# Patient Record
Sex: FEMALE | Race: WHITE | NOT HISPANIC OR LATINO | Employment: UNEMPLOYED | URBAN - METROPOLITAN AREA
[De-identification: names, ages, dates, MRNs, and addresses within clinical notes are randomized per-mention and may not be internally consistent; named-entity substitution may affect disease eponyms.]

---

## 2023-08-22 ENCOUNTER — HOSPITAL ENCOUNTER (OUTPATIENT)
Dept: DATA CONVERSION | Facility: HOSPITAL | Age: 68
Discharge: HOME | End: 2023-08-22
Payer: COMMERCIAL

## 2023-08-22 DIAGNOSIS — E78.2 MIXED HYPERLIPIDEMIA: ICD-10-CM

## 2023-08-22 DIAGNOSIS — R73.01 IMPAIRED FASTING GLUCOSE: ICD-10-CM

## 2023-08-22 DIAGNOSIS — E55.9 VITAMIN D DEFICIENCY, UNSPECIFIED: ICD-10-CM

## 2023-08-22 DIAGNOSIS — D64.9 ANEMIA, UNSPECIFIED: ICD-10-CM

## 2023-08-22 LAB
25(OH)D3 SERPL-MCNC: 38 NG/ML (ref 31–100)
ALBUMIN SERPL-MCNC: 4.2 GM/DL (ref 3.5–5)
ALBUMIN/GLOB SERPL: 1.4 RATIO (ref 1.5–3)
ALP BLD-CCNC: 66 U/L (ref 35–125)
ALT SERPL-CCNC: 11 U/L (ref 5–40)
ANION GAP SERPL CALCULATED.3IONS-SCNC: 14 MMOL/L (ref 0–19)
APPEARANCE PLAS: ABNORMAL
AST SERPL-CCNC: 13 U/L (ref 5–40)
BASOPHILS # BLD AUTO: 0.11 K/UL (ref 0–0.22)
BASOPHILS NFR BLD AUTO: 1.3 % (ref 0–1)
BILIRUB SERPL-MCNC: 0.5 MG/DL (ref 0.1–1.2)
BUN SERPL-MCNC: 13 MG/DL (ref 8–25)
BUN/CREAT SERPL: 21.7 RATIO (ref 8–21)
CALCIUM SERPL-MCNC: 9.4 MG/DL (ref 8.5–10.4)
CHLORIDE SERPL-SCNC: 104 MMOL/L (ref 97–107)
CHOLEST SERPL-MCNC: 230 MG/DL (ref 133–200)
CHOLEST/HDLC SERPL: 4.5 RATIO
CO2 SERPL-SCNC: 23 MMOL/L (ref 24–31)
COLOR SPUN FLD: ABNORMAL
CREAT SERPL-MCNC: 0.6 MG/DL (ref 0.4–1.6)
DEPRECATED RDW RBC AUTO: 42.2 FL (ref 37–54)
DIFFERENTIAL METHOD BLD: ABNORMAL
EOSINOPHIL # BLD AUTO: 0.25 K/UL (ref 0–0.45)
EOSINOPHIL NFR BLD: 3 % (ref 0–3)
ERYTHROCYTE [DISTWIDTH] IN BLOOD BY AUTOMATED COUNT: 14.4 % (ref 11.7–15)
FASTING STATUS PATIENT QL REPORTED: ABNORMAL
FERRITIN SERPL-MCNC: 8 NG/ML (ref 13–150)
GFR SERPL CREATININE-BSD FRML MDRD: 98 ML/MIN/1.73 M2
GLOBULIN SER-MCNC: 3 G/DL (ref 1.9–3.7)
GLUCOSE SERPL-MCNC: 113 MG/DL (ref 65–99)
HBA1C MFR BLD: 6.4 % (ref 4–6)
HCT VFR BLD AUTO: 40.4 % (ref 36–44)
HDLC SERPL-MCNC: 51 MG/DL
HGB BLD-MCNC: 12.7 GM/DL (ref 12–15)
IMM GRANULOCYTES # BLD AUTO: 0.02 K/UL (ref 0–0.1)
IRON SATN MFR SERPL: 14.6 % (ref 12–50)
IRON SERPL-MCNC: 53 UG/DL (ref 30–160)
LDLC SERPL CALC-MCNC: 160 MG/DL (ref 65–130)
LYMPHOCYTES # BLD AUTO: 1.36 K/UL (ref 1.2–3.2)
LYMPHOCYTES NFR BLD MANUAL: 16.4 % (ref 20–40)
MCH RBC QN AUTO: 25.9 PG (ref 26–34)
MCHC RBC AUTO-ENTMCNC: 31.4 % (ref 31–37)
MCV RBC AUTO: 82.4 FL (ref 80–100)
MONOCYTES # BLD AUTO: 0.58 K/UL (ref 0–0.8)
MONOCYTES NFR BLD MANUAL: 7 % (ref 0–8)
NEUTROPHILS # BLD AUTO: 5.99 K/UL
NEUTROPHILS # BLD AUTO: 5.99 K/UL (ref 1.8–7.7)
NEUTROPHILS.IMMATURE NFR BLD: 0.2 % (ref 0–1)
NEUTS SEG NFR BLD: 72.1 % (ref 50–70)
NRBC BLD-RTO: 0 /100 WBC
PLATELET # BLD AUTO: 350 K/UL (ref 150–450)
PMV BLD AUTO: 10.9 CU (ref 7–12.6)
POTASSIUM SERPL-SCNC: 4.3 MMOL/L (ref 3.4–5.1)
PROT SERPL-MCNC: 7.2 G/DL (ref 5.9–7.9)
RBC # BLD AUTO: 4.9 M/UL (ref 4–4.9)
SODIUM SERPL-SCNC: 141 MMOL/L (ref 133–145)
TIBC SERPL-MCNC: 363 UG/DL (ref 228–428)
TRIGL SERPL-MCNC: 94 MG/DL (ref 40–150)
TSH SERPL DL<=0.05 MIU/L-ACNC: 1.02 MIU/L (ref 0.27–4.2)
WBC # BLD AUTO: 8.3 K/UL (ref 4.5–11)

## 2023-08-23 PROBLEM — I10 ESSENTIAL HYPERTENSION: Status: ACTIVE | Noted: 2023-08-23

## 2023-08-23 PROBLEM — H90.3 SENSORINEURAL HEARING LOSS, BILATERAL: Status: ACTIVE | Noted: 2023-08-23

## 2023-08-23 PROBLEM — H60.543 ECZEMA OF BOTH EXTERNAL EARS: Status: ACTIVE | Noted: 2023-08-23

## 2023-08-23 PROBLEM — R92.8 ABNORMAL MAMMOGRAM: Status: ACTIVE | Noted: 2023-08-23

## 2023-08-23 PROBLEM — M54.16 LUMBAR RADICULITIS: Status: ACTIVE | Noted: 2023-08-23

## 2023-08-23 PROBLEM — F32.9 MAJOR DEPRESSIVE DISORDER, SINGLE EPISODE, UNSPECIFIED: Status: ACTIVE | Noted: 2023-08-23

## 2023-08-23 PROBLEM — J32.9 CHRONIC SINUSITIS: Status: ACTIVE | Noted: 2023-08-23

## 2023-08-23 PROBLEM — K57.90 DIVERTICULOSIS: Status: ACTIVE | Noted: 2023-08-23

## 2023-08-23 PROBLEM — R79.89 OTHER SPECIFIED ABNORMAL FINDINGS OF BLOOD CHEMISTRY: Status: ACTIVE | Noted: 2023-08-23

## 2023-08-23 PROBLEM — R20.2 PARESTHESIA: Status: ACTIVE | Noted: 2023-08-23

## 2023-08-23 PROBLEM — E55.9 VITAMIN D DEFICIENCY: Status: ACTIVE | Noted: 2023-08-23

## 2023-08-23 PROBLEM — J30.9 ALLERGIC RHINITIS: Status: ACTIVE | Noted: 2023-08-23

## 2023-08-23 PROBLEM — N60.91 ATYPICAL DUCTAL HYPERPLASIA OF RIGHT BREAST: Status: ACTIVE | Noted: 2023-08-23

## 2023-08-23 PROBLEM — E78.2 MIXED HYPERLIPIDEMIA: Status: ACTIVE | Noted: 2023-08-23

## 2023-08-23 PROBLEM — N95.1 MENOPAUSAL STATE: Status: ACTIVE | Noted: 2023-08-23

## 2023-08-23 RX ORDER — FLUTICASONE PROPIONATE 50 MCG
1 SPRAY, SUSPENSION (ML) NASAL DAILY
COMMUNITY
Start: 2021-02-09

## 2023-08-23 RX ORDER — AMLODIPINE BESYLATE 5 MG/1
5 TABLET ORAL DAILY
COMMUNITY
End: 2023-10-25

## 2023-08-23 RX ORDER — ESCITALOPRAM OXALATE 20 MG/1
20 TABLET ORAL DAILY
COMMUNITY

## 2023-08-23 RX ORDER — KETOCONAZOLE 20 MG/G
1 CREAM TOPICAL DAILY
COMMUNITY

## 2023-08-23 RX ORDER — CHOLECALCIFEROL (VITAMIN D3) 50 MCG
2000 TABLET ORAL DAILY
COMMUNITY
Start: 2018-04-19

## 2023-08-23 RX ORDER — FLUOCINOLONE ACETONIDE 0.1 MG/G
1 CREAM TOPICAL EVERY OTHER DAY
COMMUNITY

## 2023-08-23 RX ORDER — ALENDRONATE SODIUM 70 MG/1
70 TABLET ORAL
COMMUNITY
End: 2024-05-07 | Stop reason: SDUPTHER

## 2023-10-17 ENCOUNTER — OFFICE VISIT (OUTPATIENT)
Dept: OPHTHALMOLOGY | Facility: CLINIC | Age: 68
End: 2023-10-17
Payer: COMMERCIAL

## 2023-10-17 ENCOUNTER — CLINICAL SUPPORT (OUTPATIENT)
Dept: OPHTHALMOLOGY | Facility: CLINIC | Age: 68
End: 2023-10-17
Payer: COMMERCIAL

## 2023-10-17 ENCOUNTER — APPOINTMENT (OUTPATIENT)
Dept: OPHTHALMOLOGY | Facility: CLINIC | Age: 68
End: 2023-10-17
Payer: COMMERCIAL

## 2023-10-17 DIAGNOSIS — H52.7 REFRACTIVE ERROR: ICD-10-CM

## 2023-10-17 DIAGNOSIS — H16.202 KERATOCONJUNCTIVITIS OF LEFT EYE: ICD-10-CM

## 2023-10-17 DIAGNOSIS — H25.813 COMBINED FORMS OF AGE-RELATED CATARACT OF BOTH EYES: Primary | ICD-10-CM

## 2023-10-17 PROCEDURE — 99213 OFFICE O/P EST LOW 20 MIN: CPT | Performed by: OPHTHALMOLOGY

## 2023-10-17 PROCEDURE — 92015 DETERMINE REFRACTIVE STATE: CPT | Performed by: OPHTHALMOLOGY

## 2023-10-17 PROCEDURE — 92325 MODIFICATION OF CONTACT LENS: CPT | Performed by: OPHTHALMOLOGY

## 2023-10-17 RX ORDER — CLOTRIMAZOLE AND BETAMETHASONE DIPROPIONATE 10; .64 MG/G; MG/G
1 CREAM TOPICAL 2 TIMES DAILY
COMMUNITY
Start: 2023-09-08 | End: 2023-11-09 | Stop reason: WASHOUT

## 2023-10-17 ASSESSMENT — VISUAL ACUITY
OS_PH_CC+: -1
CORRECTION_TYPE: GLASSES
CORRECTION_TYPE: CONTACTS
METHOD: SNELLEN - SINGLE
METHOD: SNELLEN - SINGLE
OS_CC: 20/70
OD_CC: 20/25
OS_PH_CC: 20/30
OS_PH_SC+: -1
OS_PH_SC: 20/30
OS_CC: 20/150
OD_CC+: -2
OD_CC+: -1
OD_CC: 20/20

## 2023-10-17 ASSESSMENT — KERATOMETRY
METHOD_AUTO_MANUAL: AUTOMATED
OD_AXISANGLE2_DEGREES: 140
OD_K2POWER_DIOPTERS: 45.50
OS_AXISANGLE2_DEGREES: 5
OS_AXISANGLE_DEGREES: 95
OD_AXISANGLE_DEGREES: 50
OS_K1POWER_DIOPTERS: 43.50
OS_K2POWER_DIOPTERS: 44.50
OD_K1POWER_DIOPTERS: 44.75

## 2023-10-17 ASSESSMENT — ENCOUNTER SYMPTOMS
MUSCULOSKELETAL NEGATIVE: 1
GASTROINTESTINAL NEGATIVE: 0
LOSS OF SENSATION IN FEET: 0
CONSTITUTIONAL NEGATIVE: 0
CARDIOVASCULAR NEGATIVE: 0
HEMATOLOGIC/LYMPHATIC NEGATIVE: 0
ALLERGIC/IMMUNOLOGIC NEGATIVE: 0
RESPIRATORY NEGATIVE: 0
OCCASIONAL FEELINGS OF UNSTEADINESS: 0
DEPRESSION: 0
NEUROLOGICAL NEGATIVE: 0
PSYCHIATRIC NEGATIVE: 0
ENDOCRINE NEGATIVE: 0
EYES NEGATIVE: 0

## 2023-10-17 ASSESSMENT — REFRACTION_CURRENTRX
OS_SPHERE: -2.00
OS_BRAND: ULTRA
OD_BRAND: ULTRA
OS_DIAMETER: 14.2
OS_BASECURVE: 8.5
OD_SPHERE: -2.25
OD_BRAND: ULTRA
OD_DIAMETER: 14.2
OS_BRAND: ULTRA
OS_SPHERE: -2.75
OS_DIAMETER: 14.2
OD_BASECURVE: 8.5
OD_DIAMETER: 14.2
OS_BASECURVE: 8.5
OD_BASECURVE: 8.5
OD_SPHERE: -2.25

## 2023-10-17 ASSESSMENT — REFRACTION_WEARINGRX
SPECS_TYPE: OTC READERS
OD_AXIS: 128
OS_SPHERE: -1.50
OS_CYLINDER: -1.00
OS_AXIS: 012
OD_SPHERE: -1.75
SPECS_TYPE: SVL
OS_SPHERE: +2.00
OD_CYLINDER: -0.75
OD_SPHERE: +2.00

## 2023-10-17 ASSESSMENT — SLIT LAMP EXAM - LIDS
COMMENTS: NORMAL
COMMENTS: NORMAL

## 2023-10-17 ASSESSMENT — TONOMETRY
OD_IOP_MMHG: 14
OS_IOP_MMHG: 13
IOP_METHOD: GOLDMANN APPLANATION

## 2023-10-17 ASSESSMENT — PATIENT HEALTH QUESTIONNAIRE - PHQ9
2. FEELING DOWN, DEPRESSED OR HOPELESS: NOT AT ALL
1. LITTLE INTEREST OR PLEASURE IN DOING THINGS: NOT AT ALL
SUM OF ALL RESPONSES TO PHQ9 QUESTIONS 1 AND 2: 0

## 2023-10-17 ASSESSMENT — REFRACTION_MANIFEST
OD_SPHERE: -2.25
OS_SPHERE: -3.25
OS_AXIS: 010
OD_AXIS: 115
OD_CYLINDER: -0.75
METHOD_AUTOREFRACTION: 1
OS_CYLINDER: -1.25

## 2023-10-17 ASSESSMENT — CUP TO DISC RATIO
OS_RATIO: 0.3
OD_RATIO: 0.3

## 2023-10-17 ASSESSMENT — EXTERNAL EXAM - RIGHT EYE: OD_EXAM: NORMAL

## 2023-10-17 ASSESSMENT — PAIN SCALES - GENERAL: PAINLEVEL: 0-NO PAIN

## 2023-10-17 ASSESSMENT — EXTERNAL EXAM - LEFT EYE: OS_EXAM: NORMAL

## 2023-10-17 NOTE — PROGRESS NOTES
Assessment/Plan   Problem List Items Addressed This Visit          Eye/Vision problems    Combined forms of age-related cataract of both eyes - Primary     Worse cataract left eye (OS) than right eye (OD) but non significant overall. Advised would recommend to observe and correct with optical options. Could trial new soft contact lens (SCL) once left eye (OS) feeling better.          Keratoconjunctivitis of left eye     Suspect irritation related to putting in old soft contact lens (SCL). Will give new trials but instructed to hold until left eye (OS) feeling better. Will have start regular lubrication for next few days.          Refractive error     Good response to both RX and trial soft contact lens (SCL). Will provide with trials and plan to have call with results.             Provided reassurance regarding above diagnoses and care received in the office visit today. Discussed outcomes and options along with the importance of treatment compliance. Understands the importance of any follow up visits. Patient instructed to call/communicate with our office if any new issues, questions, or concerns.     Will plan to see back in 1 year for full with SCL or sooner PRN

## 2023-10-17 NOTE — ASSESSMENT & PLAN NOTE
Good response to both RX and trial soft contact lens (SCL). Will provide with trials and plan to have call with results.

## 2023-10-17 NOTE — ASSESSMENT & PLAN NOTE
Suspect irritation related to putting in old soft contact lens (SCL). Will give new trials but instructed to hold until left eye (OS) feeling better. Will have start regular lubrication for next few days.

## 2023-10-17 NOTE — ASSESSMENT & PLAN NOTE
Worse cataract left eye (OS) than right eye (OD) but non significant overall. Advised would recommend to observe and correct with optical options. Could trial new soft contact lens (SCL) once left eye (OS) feeling better.

## 2023-10-17 NOTE — PATIENT INSTRUCTIONS
Thank you so much for choosing me to provide your care today!    If you were dilated your vision may remain blurry   or light sensitive for several hours.    The nature of eye and vision problems can require frequent follow up, please make every effort to adhere to any future appointments.    If you have any issues, questions, or concerns,   please do not hesitate to reach out.    If you receive a survey in regards to your care today, please mention any exceptional care my office staff and/or technicians provided.    You can reach our office at this number:  [phone]     Artificial Tears/lubricating drops  Recommendations for daily use      Refresh  [x]Refresh Tears []Refresh Advance  []Refresh Optive  []Refresh LiquiGel  []Refresh preservative free in single use vial    Systane  [x]Systane ultra []Systane Balance  []Systane Gel   []Systane preservative free in single use vial    Genteal  []Genteal  []Genteal gel    Blink  [x]Blink   []Blink for contacts      You may use these drops:  []1 drop 2-4 times daily  [x]1 drop 4 times daily  []1 drop every 1-2 hours or as needed  []At bedtime    It is OK to substitute generic store brand varieties of drops. Artificial tears work best when used consistently instead of as needed.    *Avoid any drops for allergy/itching/redness unless directed otherwise*

## 2023-10-18 ENCOUNTER — TREATMENT (OUTPATIENT)
Dept: SPEECH THERAPY | Facility: CLINIC | Age: 68
End: 2023-10-18
Payer: COMMERCIAL

## 2023-10-18 DIAGNOSIS — H90.3 SENSORINEURAL HEARING LOSS, BILATERAL: Primary | ICD-10-CM

## 2023-10-18 DIAGNOSIS — R48.8 OTHER SYMBOLIC DYSFUNCTIONS: ICD-10-CM

## 2023-10-18 PROCEDURE — 92507 TX SP LANG VOICE COMM INDIV: CPT | Mod: GN | Performed by: SPEECH-LANGUAGE PATHOLOGIST

## 2023-10-18 ASSESSMENT — PAIN - FUNCTIONAL ASSESSMENT: PAIN_FUNCTIONAL_ASSESSMENT: 0-10

## 2023-10-18 ASSESSMENT — ENCOUNTER SYMPTOMS
LOSS OF SENSATION IN FEET: 0
OCCASIONAL FEELINGS OF UNSTEADINESS: 0
DEPRESSION: 0

## 2023-10-18 ASSESSMENT — PAIN SCALES - GENERAL: PAINLEVEL_OUTOF10: 0 - NO PAIN

## 2023-10-18 NOTE — PROGRESS NOTES
Speech-Language Pathology    Outpatient Speech-Language Pathology Treatment     Patient Name: Luisa Pearson  MRN: [id number]  Today's Date: 10/18/2023     Time Calculation  Start Time: 0730  Stop Time: 0815  Time Calculation (min): 45 min      Current Problem:   Patient Active Problem List   Diagnosis    Abnormal mammogram    Allergic rhinitis    Atypical ductal hyperplasia of right breast    Chronic sinusitis    Diverticulosis    Eczema of both external ears    Essential hypertension    Lumbar radiculitis    Major depressive disorder, single episode, unspecified    Menopausal state    Mixed hyperlipidemia    Other specified abnormal findings of blood chemistry    Paresthesia    Sensorineural hearing loss, bilateral    Vitamin D deficiency    Combined forms of age-related cataract of both eyes    Keratoconjunctivitis of left eye    Refractive error    Other symbolic dysfunctions         SLP Assessment:  Patient was seen today for an aural rehab evaluation due to LEFT cochlear implantation.     MODERATE auditory skills deficits  Therapy is warranted to increase auditory skills to improve overall communication skills.       Plan:  SLP TX Plan: Continue Plan of Care  SLP Frequency:  (1-2x per month)  Duration: 6 months  Discussed POC: Patient  Discussed Risks/Benefits: Yes, Patient  Patient/Caregiver Agreeable: Yes      Subjective   Patient is being seen for their first follow-up visit in [facility] this date. For full history, evaluation, and other details from previous care to-date, please refer to past medical records in Ambulatory Electronic Medical Records. Most recent eval/re-eval was completed on June 29, 2023.    Patient arrived on time to today's follow up session. Patient reports being sick with nasal congestion the past week or 2, minimal home programming. Patient was not able to get car looked at for blue tooth due to being sick. Patient reports experiences pressure/ear blockage feeling- Clinician recommended  going to doctor to follow up. Patient reports no recent falls this past year or fear of falling. \    Patient showed increase in errors identifying /d,g/ and /p,t/ minimal pairs in all position of words (in quiet and in noise). Continue to practice, will follow up with Dr. Valdivia, audiologist.     Clinician educated patient on the importance of home programming. Clinician provided patient with email on [employer] deshawn to download for home programming, along with Zeb[employer] deshawn, streaming to phone during phone conversations, and mini antonino with TV.       Most Recent Visit:   9/27/23- in [facility]     General Visit Information:   Reason for Referral: Cochlear Implant  Referred By: Dr. Valdivia  Past Medical History Relevant to Rehab: Hearing Loss  Patient Seen During This Visit: Yes  Arrival: Independent  Number of Authorized Treatments :  (40 dollar co pay)  Total Number of Visits : 10      Pain Assessment:   Pain Assessment: 0-10  Pain Score: 0 - No pain      Objective   Long term goals:   Patient will increase awareness of speech sounds during conversation with 70% accuracy in 6 months.     Short term goals:   Goal: Patient will identify minimal pairs differing in manner, voice, and place of articulation in quiet and in noise with 70% accuracy in 3-6 months.   Established: 6/29/23  Progress: In quiet: manner- 70%, voice: 80%, place: 60%; in noise: manner: 80%, voice: 70%, place: 50%   Status: Progressing    Goal: Patient will identify sentences of an unknown topic in quiet and in noise with 70% accuracy in 3-6 months.   Established: 6/29/23  Progress: in noise: 60%   Status: Progressing     Goal: Patient will identify 1-3 paragraphs in quiet by answering WH questions and/or providing summaries in 80% of opportunities in 3-6 months.   Established: 8/2/23; goal changed to quiet on 8/9/23  Progress: in quiet: 3 short paragraphs, WH questions: 4/5 patient could not identify main character's name.   Status: Progressing        Outpatient Education:  Adult Outpatient Education  Individual(s) Educated: Patient  Written Education : Emailed cochlear  deshawn information  Verbal Education : Educated patient on following home programming: cochlear  deshawn, HEAROES deshawn, streaming phone conversations, and mini antonino with TV  Risk and Benefits Discussed with Patient/Caregiver/Other: yes  Patient/Caregiver Demonstrated Understanding: yes  Plan of Care Discussed and Agreed Upon: yes  Patient Response to Education: Patient/Caregiver Verbalized Understanding of Information, Patient/Caregiver Performed Return Demonstration of Exercises/Activities, Patient/Caregiver Asked Appropriate Questions

## 2023-10-23 DIAGNOSIS — I10 ESSENTIAL (PRIMARY) HYPERTENSION: ICD-10-CM

## 2023-10-25 RX ORDER — AMLODIPINE BESYLATE 5 MG/1
5 TABLET ORAL DAILY
Qty: 90 TABLET | Refills: 3 | Status: SHIPPED | OUTPATIENT
Start: 2023-10-25 | End: 2024-10-24

## 2023-10-26 ENCOUNTER — DOCUMENTATION (OUTPATIENT)
Dept: OPHTHALMOLOGY | Facility: CLINIC | Age: 68
End: 2023-10-26
Payer: COMMERCIAL

## 2023-10-26 ASSESSMENT — REFRACTION_CURRENTRX
OD_SPHERE: -2.25
OD_DIAMETER: 14.2
OS_SPHERE: -3.00
OS_BASECURVE: 8.5
OD_BRAND: ULTRA
OS_AXIS: 010
OD_BASECURVE: 8.5
OS_SPHERE: -2.75
OS_BRAND: ULTRA
OD_SPHERE: -2.25
OS_BASECURVE: 8.5
OS_SPHERE: -2.00
OD_BRAND: ULTRA
OD_BRAND: ULTRA
OD_DIAMETER: 14.2
OD_BASECURVE: 8.5
OS_DIAMETER: 14.5
OS_BASECURVE: 8.7
OS_DIAMETER: 14.2
OD_SPHERE: -2.25
OS_BRAND: ULTRA
OS_DIAMETER: 14.2
OD_BASECURVE: 8.5
OS_CYLINDER: -0.75
OS_BRAND: BIOFINITY TORIC
OD_DIAMETER: 14.2

## 2023-10-26 ASSESSMENT — VISUAL ACUITY
OS_CC: 20/30
METHOD: SNELLEN - LINEAR
OD_CC: 20/20
CORRECTION_TYPE: CONTACTS
OS_CC+: -2

## 2023-11-09 ENCOUNTER — OFFICE VISIT (OUTPATIENT)
Dept: OBSTETRICS AND GYNECOLOGY | Facility: CLINIC | Age: 68
End: 2023-11-09
Payer: COMMERCIAL

## 2023-11-09 VITALS
WEIGHT: 210 LBS | SYSTOLIC BLOOD PRESSURE: 170 MMHG | HEIGHT: 68 IN | BODY MASS INDEX: 31.83 KG/M2 | DIASTOLIC BLOOD PRESSURE: 80 MMHG

## 2023-11-09 DIAGNOSIS — B37.2 CANDIDIASIS OF SKIN: ICD-10-CM

## 2023-11-09 DIAGNOSIS — M81.0 AGE-RELATED OSTEOPOROSIS WITHOUT CURRENT PATHOLOGICAL FRACTURE: ICD-10-CM

## 2023-11-09 DIAGNOSIS — N95.1 MENOPAUSAL STATE: ICD-10-CM

## 2023-11-09 DIAGNOSIS — Z12.12 SCREENING FOR MALIGNANT NEOPLASM OF THE RECTUM: ICD-10-CM

## 2023-11-09 DIAGNOSIS — Z12.31 SCREENING MAMMOGRAM FOR BREAST CANCER: Primary | ICD-10-CM

## 2023-11-09 DIAGNOSIS — Z01.419 VISIT FOR GYNECOLOGIC EXAMINATION: ICD-10-CM

## 2023-11-09 DIAGNOSIS — N60.91 ATYPICAL DUCTAL HYPERPLASIA OF RIGHT BREAST: ICD-10-CM

## 2023-11-09 PROCEDURE — 1126F AMNT PAIN NOTED NONE PRSNT: CPT | Performed by: OBSTETRICS & GYNECOLOGY

## 2023-11-09 PROCEDURE — 99397 PER PM REEVAL EST PAT 65+ YR: CPT | Performed by: OBSTETRICS & GYNECOLOGY

## 2023-11-09 PROCEDURE — 1159F MED LIST DOCD IN RCRD: CPT | Performed by: OBSTETRICS & GYNECOLOGY

## 2023-11-09 PROCEDURE — 3077F SYST BP >= 140 MM HG: CPT | Performed by: OBSTETRICS & GYNECOLOGY

## 2023-11-09 PROCEDURE — 1036F TOBACCO NON-USER: CPT | Performed by: OBSTETRICS & GYNECOLOGY

## 2023-11-09 PROCEDURE — 3079F DIAST BP 80-89 MM HG: CPT | Performed by: OBSTETRICS & GYNECOLOGY

## 2023-11-09 ASSESSMENT — ENCOUNTER SYMPTOMS
UNEXPECTED WEIGHT CHANGE: 0
DYSURIA: 0
COLOR CHANGE: 0
DIZZINESS: 0
ABDOMINAL PAIN: 0
HEADACHES: 0
ABDOMINAL DISTENTION: 0
DIFFICULTY URINATING: 0
ACTIVITY CHANGE: 0
ADENOPATHY: 0
WEAKNESS: 0
FATIGUE: 0
CHEST TIGHTNESS: 0
SHORTNESS OF BREATH: 0
JOINT SWELLING: 0

## 2023-11-09 ASSESSMENT — LIFESTYLE VARIABLES
AUDIT-C TOTAL SCORE: 1
HOW MANY STANDARD DRINKS CONTAINING ALCOHOL DO YOU HAVE ON A TYPICAL DAY: 1 OR 2
HOW OFTEN DO YOU HAVE A DRINK CONTAINING ALCOHOL: MONTHLY OR LESS
SKIP TO QUESTIONS 9-10: 1
HOW OFTEN DO YOU HAVE SIX OR MORE DRINKS ON ONE OCCASION: NEVER

## 2023-11-09 NOTE — PROGRESS NOTES
"Annual-menopause  Subjective   Luisa Pearson is a 68 y.o.  former pt Dr. Briones, female who is here for a routine exam. Reports some vulvar bleeding brought on by excoriation but uses cream for relief. Denies any breast changes, pressure, bloating, or pain.     L cochlear implant in August 2021, which was activated in October 2021. R ear has hearing aide. Reports that she struggles most with clarity of speech when listening to others speak. This now PRECLUDES MRI breast screens.    Complaints: some vaginal irritation/itchiness  Periods: menopausal  Pain: none reported  AT risk for breast cancer but no longer an MRI candidate since she has had a cochlear implant.  Last pap smear date 10/27/2020- NEG, 10/12/2017- NEG, 09/23/2014- NEG, 10/2012 - negative.   Abnormal pap smear [facility] biopsy remotely .   Mammogram 12/27/22 neg; TC 34%; hx bxs.   DEXA Scans 10/04/2022: spine-0.5/ hip-2.6.   Menarche 10.   LMP 2007=age 52.   Sexual activity not currently sexually active.   Total pregnancies  0.    Review of Systems   Constitutional:  Negative for activity change, fatigue and unexpected weight change.   Respiratory:  Negative for chest tightness and shortness of breath.    Cardiovascular:  Negative for chest pain and leg swelling.   Gastrointestinal:  Negative for abdominal distention and abdominal pain.   Genitourinary:  Positive for genital sores. Negative for difficulty urinating, dysuria, pelvic pain, vaginal bleeding, vaginal discharge and vaginal pain.   Musculoskeletal:  Negative for gait problem and joint swelling.   Skin:  Negative for color change and rash.   Neurological:  Negative for dizziness, weakness and headaches.   Hematological:  Negative for adenopathy.   Objective   Visit Vitals  /80   Ht 1.715 m (5' 7.5\")   Wt 95.3 kg (210 lb)   LMP  (LMP Unknown)   BMI 32.41 kg/m²   OB Status Postmenopausal   Smoking Status Never   BSA 2.13 m²       General:   Alert and oriented, in no acute distress   Neck: " Supple. No visible thyromegaly.    Breast/Axilla: Normal to palpation bilaterally without masses, skin changes, or nipple discharge.    Abdomen: Soft, non-tender, without masses or organomegaly   Vulva: Atrophic mucosa with R labia majora excoriations along ridge   Vagina: atrophic mucosa ; modest vault restiction; NO  lesions, masses, or blood, or vaginal discharge.    Cervix: Normal without masses, lesions, or signs of cervicitis.    Uterus: Normal mobile, non-enlarged uterus    Adnexa: No palp  masses or tenderness   Pelvic Floor No POP noted. No high tone pelvic floor    Psych  Rectal Normal affect. Normal mood.   Normal stool, no masses, guaiac negative     Assessment/Plan    Encounter Diagnoses   Name Primary?    Visit for gynecologic examination; grossly nl breast exam; gyn exam pos for vulvar irritation.     Screening mammogram for breast cancer; known high TC risk due to hx ADH RT breast Yes    Menopausal state; mostly urogyn sxs now.     Screening for malignant neoplasm of the rectum; gc neg     Candidiasis of skin; good relief w meds;will call for refill when needed; pericare reviewed.     Age-related osteoporosis without current pathological fracture; on alendronate; next scan due 10/24.    ADH RT breast=increased risk for futurre cancer.  Abi Milligan MD

## 2023-11-10 PROBLEM — B37.2 CANDIDIASIS OF SKIN: Status: ACTIVE | Noted: 2023-11-10

## 2023-11-10 PROBLEM — M81.0 AGE-RELATED OSTEOPOROSIS WITHOUT CURRENT PATHOLOGICAL FRACTURE: Status: ACTIVE | Noted: 2023-11-10

## 2023-11-10 PROBLEM — Z01.419 VISIT FOR GYNECOLOGIC EXAMINATION: Status: ACTIVE | Noted: 2023-11-10

## 2023-11-10 PROBLEM — Z12.31 SCREENING MAMMOGRAM FOR BREAST CANCER: Status: ACTIVE | Noted: 2023-11-10

## 2023-11-10 PROBLEM — Z12.12 SCREENING FOR MALIGNANT NEOPLASM OF THE RECTUM: Status: ACTIVE | Noted: 2023-11-10

## 2023-11-28 ENCOUNTER — ANCILLARY PROCEDURE (OUTPATIENT)
Dept: RADIOLOGY | Facility: CLINIC | Age: 68
End: 2023-11-28
Payer: COMMERCIAL

## 2023-11-28 DIAGNOSIS — Z13.6 ENCOUNTER FOR SCREENING FOR CARDIOVASCULAR DISORDERS: Primary | ICD-10-CM

## 2023-11-28 DIAGNOSIS — R91.1 LUNG NODULE: ICD-10-CM

## 2023-11-28 PROCEDURE — 75571 CT HRT W/O DYE W/CA TEST: CPT

## 2023-11-29 ENCOUNTER — TREATMENT (OUTPATIENT)
Dept: SPEECH THERAPY | Facility: CLINIC | Age: 68
End: 2023-11-29
Payer: COMMERCIAL

## 2023-11-29 DIAGNOSIS — H90.3 SENSORINEURAL HEARING LOSS, BILATERAL: Primary | ICD-10-CM

## 2023-11-29 DIAGNOSIS — R48.8 OTHER SYMBOLIC DYSFUNCTIONS: ICD-10-CM

## 2023-11-29 PROCEDURE — 92507 TX SP LANG VOICE COMM INDIV: CPT | Mod: GN | Performed by: SPEECH-LANGUAGE PATHOLOGIST

## 2023-11-29 ASSESSMENT — PAIN SCALES - GENERAL: PAINLEVEL_OUTOF10: 0 - NO PAIN

## 2023-11-29 ASSESSMENT — PAIN - FUNCTIONAL ASSESSMENT: PAIN_FUNCTIONAL_ASSESSMENT: 0-10

## 2023-11-29 NOTE — PROGRESS NOTES
Speech-Language Pathology    Outpatient Speech-Language Pathology Treatment     Patient Name: Luisa Pearson  MRN: [id number]  Today's Date: 11/29/2023     Time Calculation  Start Time: 0730  Stop Time: 0815  Time Calculation (min): 45 min      Current Problem:   1. Sensorineural hearing loss, bilateral        2. Other symbolic dysfunctions              SLP Assessment:  Patient was seen today for an aural rehab evaluation due to LEFT cochlear implantation.      MODERATE auditory skills deficits  Therapy is warranted to increase auditory skills to improve overall communication skills.       Plan:  SLP TX Plan: Continue Plan of Care  Duration: 6 months  Discussed POC: Patient  Discussed Risks/Benefits: Patient, Yes  Patient/Caregiver Agreeable: Yes      Subjective   Patient arrived on time to today's session. Patient reports minimal home programming during the holidays. Patient reports some home programing with apps. Patient further reports noting improvements in listening during family dinners. Patient reports using Facetime while streaming to CI for the first time- went well. Patient reports improving in changing HA and CI programs to best fit listening environment.     General Visit Information:   Reason for Referral: Cochlear Implant  Referred By: Dr. Valdivia, Dr. Briones  Past Medical History Relevant to Rehab: Hearing Loss; Cochlear Implant  Patient Seen During This Visit: Yes  Arrival: Independent  Total Number of Visits : 11      Pain Assessment:   Pain Assessment: 0-10  Pain Score: 0 - No pain      Objective   Long term goals:   Patient will increase awareness of speech sounds during conversation with 70% accuracy in 6 months.     Short term goals:   Goal: Patient will identify minimal pairs differing in manner, voice, and place of articulation in quiet and in noise with 70% accuracy in 3-6 months.   Established: 6/29/23  Progress: In quiet: manner- 100%, voice: 100%, place: 60%;   Status: Progressing (mastered  manner and voice in quiet and in noise, continue to work on place cues)      Goal: Patient will identify sentences of an unknown topic in quiet and in noise with 70% accuracy in 3-6 months.   Established: 6/29/23  Progress: Did not target   Status: Progressing (In quiet, mastered)      Goal: Patient will identify 1-3 paragraphs in quiet by answering WH questions and/or providing summaries in 80% of opportunities in 3-6 months.   Established: 8/2/23; goal changed to quiet on 8/9/23  Progress: in quiet: 3 short paragraphs: retelling story- 4/5   Status: Progressing         Outpatient Education:  Clinician educated patient on phone settings, new home programming (Cochlear [employer] & HE[employer]), and idenitifying minimal pairs differing by place /g, d/ and /p, t/.

## 2023-11-30 ENCOUNTER — TELEPHONE (OUTPATIENT)
Dept: PRIMARY CARE | Facility: CLINIC | Age: 68
End: 2023-11-30
Payer: COMMERCIAL

## 2023-11-30 NOTE — TELEPHONE ENCOUNTER
----- Message from Qasim Underwood MD sent at 11/29/2023  5:59 PM EST -----  Please tell her that thankfully the calcium score is very low and I have a low suspicion for heart disease.  There is a small lung nodule on the right and the radiologist is suggesting we do a CAT scan specifically for this to get a better look at it and to make sure there are no other nodules.  This is standard when a lung nodule is discovered on a CT scan like this and I think she should go ahead and do it so I placed the order.

## 2023-12-19 ENCOUNTER — ANCILLARY PROCEDURE (OUTPATIENT)
Dept: RADIOLOGY | Facility: CLINIC | Age: 68
End: 2023-12-19
Payer: COMMERCIAL

## 2023-12-19 DIAGNOSIS — R91.1 LUNG NODULE: ICD-10-CM

## 2023-12-19 PROCEDURE — 71250 CT THORAX DX C-: CPT

## 2023-12-19 PROCEDURE — 71250 CT THORAX DX C-: CPT | Performed by: RADIOLOGY

## 2023-12-26 ENCOUNTER — TELEPHONE (OUTPATIENT)
Dept: PRIMARY CARE | Facility: CLINIC | Age: 68
End: 2023-12-26

## 2023-12-26 NOTE — TELEPHONE ENCOUNTER
----- Message from Qasim Underwood MD sent at 12/21/2023  1:03 PM EST -----  There are small nodules in the lung and we will probably repeat the scan one more time around the time she's already scheduled to see him in March.  There is small amount of fluid near the heart but I don't think we have to do additional testing on this unless she's having any symptoms like feeling short of breath or having chest pain.

## 2023-12-28 ENCOUNTER — HOSPITAL ENCOUNTER (OUTPATIENT)
Dept: RADIOLOGY | Facility: HOSPITAL | Age: 68
Discharge: HOME | End: 2023-12-28
Payer: COMMERCIAL

## 2023-12-28 VITALS — BODY MASS INDEX: 31.83 KG/M2 | WEIGHT: 210 LBS | HEIGHT: 68 IN

## 2023-12-28 DIAGNOSIS — Z12.31 SCREENING MAMMOGRAM FOR BREAST CANCER: ICD-10-CM

## 2023-12-28 PROCEDURE — 77063 BREAST TOMOSYNTHESIS BI: CPT

## 2024-02-06 ENCOUNTER — OFFICE VISIT (OUTPATIENT)
Dept: OTOLARYNGOLOGY | Facility: CLINIC | Age: 69
End: 2024-02-06
Payer: MEDICARE

## 2024-02-06 VITALS — HEIGHT: 68 IN | BODY MASS INDEX: 31.93 KG/M2

## 2024-02-06 DIAGNOSIS — H90.3 SENSORINEURAL HEARING LOSS (SNHL) OF BOTH EARS: ICD-10-CM

## 2024-02-06 DIAGNOSIS — J30.9 ALLERGIC RHINITIS, UNSPECIFIED SEASONALITY, UNSPECIFIED TRIGGER: ICD-10-CM

## 2024-02-06 DIAGNOSIS — H60.543 ECZEMA OF BOTH EXTERNAL EARS: Primary | ICD-10-CM

## 2024-02-06 PROCEDURE — 1159F MED LIST DOCD IN RCRD: CPT | Performed by: OTOLARYNGOLOGY

## 2024-02-06 PROCEDURE — 99213 OFFICE O/P EST LOW 20 MIN: CPT | Performed by: OTOLARYNGOLOGY

## 2024-02-06 PROCEDURE — 1126F AMNT PAIN NOTED NONE PRSNT: CPT | Performed by: OTOLARYNGOLOGY

## 2024-02-06 PROCEDURE — 1036F TOBACCO NON-USER: CPT | Performed by: OTOLARYNGOLOGY

## 2024-02-06 ASSESSMENT — ENCOUNTER SYMPTOMS
OCCASIONAL FEELINGS OF UNSTEADINESS: 0
DEPRESSION: 0
LOSS OF SENSATION IN FEET: 0

## 2024-02-06 ASSESSMENT — PAIN SCALES - GENERAL: PAINLEVEL: 0-NO PAIN

## 2024-02-06 NOTE — PROGRESS NOTES
"Chief Complaint   Patient presents with    Follow-up     ANNUAL VISIT      Date of Evaluation: 2/6/2024   HPI  Luisa Pearson is a 68 y.o. female  is here for follow-up on her ears. She continues with some eczema complaints. Cochlear implant working well.  Her allergies are bothering her more.  She is only using Flonase every other day  History:  with a history of bilateral sensorineural hearing loss and bilateral eczema of the ear canals. She has a left cochlear implant and wears a right hearing aid that transmits to the cochlear implant. She is using Flonase for allergic rhinitis        Past Medical History:   Diagnosis Date    Age-related nuclear cataract of both eyes     Personal history of diseases of the skin and subcutaneous tissue     History of eczema    Personal history of other diseases of the circulatory system     History of hypertension    Personal history of other mental and behavioral disorders     History of depression    Unspecified disorder of refraction       Past Surgical History:   Procedure Laterality Date    OTHER SURGICAL HISTORY  02/08/2022    Cochlear implant surgery          Medications:   Current Outpatient Medications   Medication Instructions    alendronate (FOSAMAX) 70 mg, oral, Every 7 days, 30 MIN BEFORE FIRST FOOD, DRINK OR MEDICINE OF THE DAY WITH WATER    amLODIPine (NORVASC) 5 mg, oral, Daily    cholecalciferol (VITAMIN D-3) 2,000 Units, oral, Daily    escitalopram (LEXAPRO) 20 mg, oral, Daily    fluocinolone 0.01 % cream 1 Application, Topical, Every other day, APPLY TO AFFECTED AREA    fluticasone (Flonase) 50 mcg/actuation nasal spray 1 spray, Each Nostril, Daily    ketoconazole (NIZOral) 2 % cream 1 Application, Topical, Daily        Allergies:  Allergies   Allergen Reactions    Penicillins Unknown    Cefdinir Unknown        Physical Exam:  Last Recorded Vitals  Height 1.727 m (5' 8\").  []General appearance: Well-developed, well-nourished in no acute distress, conversant " with normal voice quality    Head/face: No erythema or edema or facial tenderness, and normal facial nerve function bilaterally    External ear: Clear external auditory canals with normal pinnae, bilateral eczema.  Cerumen removed  Tube status: N/A  Middle ear: Tympanic membranes intact and mobile, middle ears normal.  Tympanic membrane perforation: N/A  Mastoid bowl: N/A  Hearing: Normal conversational awareness at normal speech thresholds    Nose visualized using: Anterior rhinoscopy  Nasal dorsum: Nontraumatic midline appearance  Septum: Midline, nonobstructing  Inferior turbinates: Normal, pink  Secretions: Dry    Oral cavity and oropharynx: Normal  Teeth: Good condition  Floor of mouth: without lesions  Palate: Normal hard palate, soft palate and uvula  Oropharynx: Clear, no lesions present  Buccal mucosa: Normal without masses or lesions  Lips: Normal    Nasopharynx: Inadequate mirror exam secondary to gag/anatomy    Neck:  Salivary glands: Normal bilateral parotid and submandibular glands by inspection and palpation.  Non-thyroid masses: No palpable masses or significant lymphadenopathy  Trachea: Midline  Thyroid: No thyromegaly or palpable nodules  Temporomandibular joint: Nontender  Cervical range of motion: Normal    Neurologic exam: Alert and oriented x3, appropriate affect.  Cranial nerves II-XII normal bilaterally  Extraocular movement: Extraocular movement intact, normal gaze alignment        Luisa was seen today for follow-up.  Diagnoses and all orders for this visit:  Eczema of both external ears (Primary)  Sensorineural hearing loss (SNHL) of both ears  Allergic rhinitis, unspecified seasonality, unspecified trigger       PLAN  Increase Flonase to daily use.  Fluocinolone cream bilaterally as needed.  Recheck in 1 year.  Continue with cochlear implant    Arden Briones MD

## 2024-03-04 ASSESSMENT — ENCOUNTER SYMPTOMS
SHORTNESS OF BREATH: 0
ABDOMINAL PAIN: 0
FEVER: 0

## 2024-03-04 NOTE — PROGRESS NOTES
[facility]: [facility]  PROGRESS NOTE      Luisa Pearson is a 68 y.o. female that is presenting today for followup, depression.    Assessment/Plan   Diagnoses and all orders for this visit:  Current mild episode of major depressive disorder without prior episode (CMS/HCC)  Essential hypertension  -     CBC and Auto Differential; Future  -     Comprehensive Metabolic Panel; Future  -     Lipid Panel; Future  -     TSH with reflex to Free T4 if abnormal; Future  Vitamin D deficiency  -     Vitamin D 25-Hydroxy,Total (for eval of Vitamin D levels); Future  Encounter for routine laboratory testing  -     CBC and Auto Differential; Future  -     Comprehensive Metabolic Panel; Future  -     Hemoglobin A1C; Future  -     Lipid Panel; Future  -     Vitamin D 25-Hydroxy,Total (for eval of Vitamin D levels); Future  -     TSH with reflex to Free T4 if abnormal; Future  Hyperglycemia  -     POCT glycosylated hemoglobin (Hb A1C) manually resulted  -     Hemoglobin A1C; Future  Depression, unspecified depression type  -     buPROPion XL (Wellbutrin XL) 150 mg 24 hr tablet; Take 1 tablet (150 mg) by mouth once daily in the morning. Do not crush, chew, or split.  Lung nodules  -     CT chest wo IV contrast; Future  Gastroesophageal reflux disease without esophagitis  -     omeprazole OTC (PriLOSEC OTC) 20 mg EC tablet; Take 1 tablet (20 mg) by mouth once daily. Do not crush, chew, or split.  Other orders  -     Follow Up In Primary Care - Medicare Annual; Future    Will try to add wellbutrin to ssri.  Pulmonary nodules - will continue to monitor.  Trace pericardial effusion - no SOB/cardiac symptoms.  Doubt significant.  Was incidentally found on CT calcium score screening.    Subjective   HPI  68 y.o. female here for follow up.  Sturggles a bit more w/ mood - not sure if ssri effective.  No SI.    Review of Systems   Constitutional:  Negative for fever.   Respiratory:  Negative for shortness of breath.   "  Cardiovascular:  Negative for chest pain.   Gastrointestinal:  Negative for abdominal pain.   All other systems reviewed and are negative.     Objective   Vitals:    03/05/24 1055   BP: 120/70   Pulse: 68   Temp: 36.5 °C (97.7 °F)   SpO2: 98%      Body mass index is 31.93 kg/m².  Physical Exam  Vitals reviewed.   Constitutional:       Appearance: Normal appearance.   Cardiovascular:      Rate and Rhythm: Normal rate and regular rhythm.      Heart sounds: No murmur heard.  Pulmonary:      Breath sounds: Normal breath sounds. No wheezing, rhonchi or rales.   Musculoskeletal:      Right lower leg: No edema.      Left lower leg: No edema.       Diagnostic Results   Lab Results   Component Value Date    GLUCOSE 113 (H) 08/22/2023    CALCIUM 9.4 08/22/2023     08/22/2023    K 4.3 08/22/2023    CO2 23 (L) 08/22/2023     08/22/2023    BUN 13 08/22/2023    CREATININE 0.6 08/22/2023     Lab Results   Component Value Date    ALT 11 08/22/2023    AST 13 08/22/2023    ALKPHOS 66 08/22/2023    BILITOT 0.5 08/22/2023     Lab Results   Component Value Date    WBC 8.3 08/22/2023    HGB 12.7 08/22/2023    HCT 40.4 08/22/2023    MCV 82.4 08/22/2023     08/22/2023     Lab Results   Component Value Date    CHOL 230 (H) 08/22/2023    CHOL 225 (H) 10/04/2022    CHOL 228 (H) 08/16/2022     Lab Results   Component Value Date    HDL 51 08/22/2023    HDL 49 (L) 10/04/2022    HDL 46 (L) 08/16/2022     Lab Results   Component Value Date    LDLCALC 160 (H) 08/22/2023    LDLCALC 156 (H) 10/04/2022    LDLCALC 160 (H) 08/16/2022     Lab Results   Component Value Date    TRIG 94 08/22/2023    TRIG 101 10/04/2022    TRIG 112 08/16/2022     No components found for: \"CHOLHDL\"  Lab Results   Component Value Date    HGBA1C 6.6 (A) 03/05/2024     Other labs not included in the list above were reviewed either before or during this encounter.    History    Past Medical History:   Diagnosis Date    Age-related nuclear cataract of both " eyes     Personal history of diseases of the skin and subcutaneous tissue     History of eczema    Personal history of other diseases of the circulatory system     History of hypertension    Personal history of other mental and behavioral disorders     History of depression    Unspecified disorder of refraction      Past Surgical History:   Procedure Laterality Date    OTHER SURGICAL HISTORY  02/08/2022    Cochlear implant surgery     Family History   Problem Relation Name Age of Onset    Lung cancer Mother      Prostate cancer Father       Social History     Socioeconomic History    Marital status:      Spouse name: Not on file    Number of children: Not on file    Years of education: Not on file    Highest education level: Not on file   Occupational History    Not on file   Tobacco Use    Smoking status: Never    Smokeless tobacco: Never   Vaping Use    Vaping Use: Never used   Substance and Sexual Activity    Alcohol use: Yes     Alcohol/week: 2.0 standard drinks of alcohol     Types: 2 Glasses of wine per week    Drug use: Not Currently    Sexual activity: Not Currently     Birth control/protection: Post-menopausal   Other Topics Concern    Not on file   Social History Narrative    Not on file     Social Determinants of Health     Financial Resource Strain: Not on file   Food Insecurity: Not on file   Transportation Needs: Not on file   Physical Activity: Not on file   Stress: Not on file   Social Connections: Not on file   Intimate Partner Violence: Not on file   Housing Stability: Not on file     Allergies   Allergen Reactions    Penicillins Unknown    Cefdinir Unknown     Current Outpatient Medications on File Prior to Visit   Medication Sig Dispense Refill    alendronate (Fosamax) 70 mg tablet Take 1 tablet (70 mg) by mouth every 7 days. 30 MIN BEFORE FIRST FOOD, DRINK OR MEDICINE OF THE DAY WITH WATER      amLODIPine (Norvasc) 5 mg tablet Take 1 tablet (5 mg) by mouth once daily. 90 tablet 3     cholecalciferol (Vitamin D-3) 50 MCG (2000 UT) tablet Take 1 tablet (2,000 Units) by mouth once daily.      escitalopram (Lexapro) 20 mg tablet Take 1 tablet (20 mg) by mouth once daily.      fluocinolone 0.01 % cream Apply 1 Application topically every other day. APPLY TO AFFECTED AREA      fluticasone (Flonase) 50 mcg/actuation nasal spray Administer 1 spray into each nostril once daily.      ketoconazole (NIZOral) 2 % cream Apply 1 Application topically once daily.       No current facility-administered medications on file prior to visit.     Immunization History   Administered Date(s) Administered    Flu vaccine (IIV4), preservative free *Check age/dose* 10/16/2019    Flu vaccine, quadrivalent, high-dose, preservative free, age 65y+ (FLUZONE) 10/24/2020, 11/17/2022    Flu vaccine, quadrivalent, no egg protein, age 6 month or greater (FLUCELVAX) 10/18/2017, 10/11/2018    Influenza, Seasonal, Quadrivalent, Adjuvanted 11/03/2021    Influenza, injectable, quadrivalent 10/08/2018, 10/19/2020    Influenza, seasonal, injectable 10/01/2014, 10/30/2014    Influenza, seasonal, injectable, preservative free 10/06/2015    Pfizer COVID-19 vaccine, Fall 2023, 12 years and older, (30mcg/0.3mL) 11/09/2023    Pfizer Gray Cap SARS-CoV-2 01/22/2022    Pfizer Purple Cap SARS-CoV-2 05/20/2021, 06/15/2021    Pneumococcal conjugate vaccine, 13-valent (PREVNAR 13) 02/11/2020    Pneumococcal polysaccharide vaccine, 23-valent, age 2 years and older (PNEUMOVAX 23) 04/13/2021, 08/23/2022     Patient's medical history was reviewed and updated either before or during this encounter.       Qasim Underwood MD

## 2024-03-05 ENCOUNTER — OFFICE VISIT (OUTPATIENT)
Dept: PRIMARY CARE | Facility: CLINIC | Age: 69
End: 2024-03-05
Payer: MEDICARE

## 2024-03-05 VITALS
OXYGEN SATURATION: 98 % | SYSTOLIC BLOOD PRESSURE: 120 MMHG | HEART RATE: 68 BPM | BODY MASS INDEX: 31.93 KG/M2 | TEMPERATURE: 97.7 F | DIASTOLIC BLOOD PRESSURE: 70 MMHG | WEIGHT: 210 LBS

## 2024-03-05 DIAGNOSIS — R91.8 LUNG NODULES: ICD-10-CM

## 2024-03-05 DIAGNOSIS — I10 ESSENTIAL HYPERTENSION: ICD-10-CM

## 2024-03-05 DIAGNOSIS — E55.9 VITAMIN D DEFICIENCY: ICD-10-CM

## 2024-03-05 DIAGNOSIS — R73.9 HYPERGLYCEMIA: ICD-10-CM

## 2024-03-05 DIAGNOSIS — K21.9 GASTROESOPHAGEAL REFLUX DISEASE WITHOUT ESOPHAGITIS: ICD-10-CM

## 2024-03-05 DIAGNOSIS — F32.A DEPRESSION, UNSPECIFIED DEPRESSION TYPE: ICD-10-CM

## 2024-03-05 DIAGNOSIS — F32.0 CURRENT MILD EPISODE OF MAJOR DEPRESSIVE DISORDER WITHOUT PRIOR EPISODE (CMS-HCC): Primary | ICD-10-CM

## 2024-03-05 DIAGNOSIS — Z01.89 ENCOUNTER FOR ROUTINE LABORATORY TESTING: ICD-10-CM

## 2024-03-05 LAB — POC HEMOGLOBIN A1C: 6.6 % (ref 4.2–6.5)

## 2024-03-05 PROCEDURE — 99214 OFFICE O/P EST MOD 30 MIN: CPT | Performed by: INTERNAL MEDICINE

## 2024-03-05 PROCEDURE — 1126F AMNT PAIN NOTED NONE PRSNT: CPT | Performed by: INTERNAL MEDICINE

## 2024-03-05 PROCEDURE — 1159F MED LIST DOCD IN RCRD: CPT | Performed by: INTERNAL MEDICINE

## 2024-03-05 PROCEDURE — 3074F SYST BP LT 130 MM HG: CPT | Performed by: INTERNAL MEDICINE

## 2024-03-05 PROCEDURE — 83036 HEMOGLOBIN GLYCOSYLATED A1C: CPT | Mod: QW | Performed by: INTERNAL MEDICINE

## 2024-03-05 PROCEDURE — 1036F TOBACCO NON-USER: CPT | Performed by: INTERNAL MEDICINE

## 2024-03-05 PROCEDURE — 3078F DIAST BP <80 MM HG: CPT | Performed by: INTERNAL MEDICINE

## 2024-03-05 RX ORDER — BUPROPION HYDROCHLORIDE 150 MG/1
150 TABLET ORAL EVERY MORNING
Qty: 30 TABLET | Refills: 5 | Status: SHIPPED | OUTPATIENT
Start: 2024-03-05 | End: 2024-09-01

## 2024-03-05 RX ORDER — OMEPRAZOLE 20 MG/1
20 TABLET, DELAYED RELEASE ORAL DAILY
Qty: 30 TABLET | Refills: 11 | COMMUNITY
Start: 2024-03-05 | End: 2025-03-05

## 2024-03-05 ASSESSMENT — PATIENT HEALTH QUESTIONNAIRE - PHQ9
SUM OF ALL RESPONSES TO PHQ9 QUESTIONS 1 AND 2: 2
SUM OF ALL RESPONSES TO PHQ9 QUESTIONS 1 AND 2: 0
1. LITTLE INTEREST OR PLEASURE IN DOING THINGS: MORE THAN HALF THE DAYS
2. FEELING DOWN, DEPRESSED OR HOPELESS: NOT AT ALL
2. FEELING DOWN, DEPRESSED OR HOPELESS: NOT AT ALL
1. LITTLE INTEREST OR PLEASURE IN DOING THINGS: NOT AT ALL

## 2024-03-05 ASSESSMENT — PAIN SCALES - GENERAL: PAINLEVEL: 0-NO PAIN

## 2024-03-05 ASSESSMENT — ENCOUNTER SYMPTOMS
LOSS OF SENSATION IN FEET: 0
DEPRESSION: 0
OCCASIONAL FEELINGS OF UNSTEADINESS: 0

## 2024-03-15 ENCOUNTER — TELEPHONE (OUTPATIENT)
Dept: OTOLARYNGOLOGY | Facility: CLINIC | Age: 69
End: 2024-03-15
Payer: MEDICARE

## 2024-03-15 DIAGNOSIS — J01.90 ACUTE NON-RECURRENT SINUSITIS, UNSPECIFIED LOCATION: Primary | ICD-10-CM

## 2024-03-15 NOTE — TELEPHONE ENCOUNTER
Pt is having a cough that wont go away and she said she will wake up with a sore throat. She is using her Flonase as well for allergies. Any advice?

## 2024-03-19 RX ORDER — AZITHROMYCIN 250 MG/1
TABLET, FILM COATED ORAL
Qty: 6 TABLET | Refills: 0 | Status: SHIPPED | OUTPATIENT
Start: 2024-03-19

## 2024-04-16 ENCOUNTER — TELEPHONE (OUTPATIENT)
Dept: OTOLARYNGOLOGY | Facility: CLINIC | Age: 69
End: 2024-04-16
Payer: MEDICARE

## 2024-04-16 DIAGNOSIS — J31.0 RHINITIS, UNSPECIFIED TYPE: Primary | ICD-10-CM

## 2024-04-22 DIAGNOSIS — J31.0 RHINITIS, UNSPECIFIED TYPE: ICD-10-CM

## 2024-04-22 RX ORDER — FLUTICASONE PROPIONATE 50 MCG
SPRAY, SUSPENSION (ML) NASAL
Qty: 48 G | Refills: 11 | Status: SHIPPED | OUTPATIENT
Start: 2024-04-22

## 2024-04-22 RX ORDER — FLUTICASONE PROPIONATE 50 MCG
SPRAY, SUSPENSION (ML) NASAL
Qty: 1 ML | Refills: 11 | Status: SHIPPED | OUTPATIENT
Start: 2024-04-22 | End: 2024-04-22

## 2024-04-22 NOTE — TELEPHONE ENCOUNTER
Patient was last treated in the office on 2/2024 and requests 90 day supply of Flonase to [facility].

## 2024-05-07 ENCOUNTER — TELEPHONE (OUTPATIENT)
Dept: PRIMARY CARE | Facility: CLINIC | Age: 69
End: 2024-05-07
Payer: MEDICARE

## 2024-05-07 DIAGNOSIS — M81.0 OSTEOPOROSIS, UNSPECIFIED OSTEOPOROSIS TYPE, UNSPECIFIED PATHOLOGICAL FRACTURE PRESENCE: ICD-10-CM

## 2024-05-07 RX ORDER — ALENDRONATE SODIUM 70 MG/1
70 TABLET ORAL
Qty: 12 TABLET | Refills: 3 | Status: SHIPPED | OUTPATIENT
Start: 2024-05-07

## 2024-05-29 ENCOUNTER — TELEPHONE (OUTPATIENT)
Dept: PRIMARY CARE | Facility: CLINIC | Age: 69
End: 2024-05-29
Payer: MEDICARE

## 2024-05-29 NOTE — TELEPHONE ENCOUNTER
Pt states both big toes are infected, and her ankles swell to the size of a grapefruit.  She elevates her legs at night and then wakes up to her ankles being a normal size.  The cuticle area on her big toes look like there is either blood or pus x 1 week.  I told her to go to either the er or urgent care at the least.  She stated she will go to urgent care.

## 2024-08-27 DIAGNOSIS — F32.A DEPRESSION, UNSPECIFIED DEPRESSION TYPE: ICD-10-CM

## 2024-08-27 RX ORDER — BUPROPION HYDROCHLORIDE 150 MG/1
150 TABLET ORAL EVERY MORNING
Qty: 30 TABLET | Refills: 5 | Status: SHIPPED | OUTPATIENT
Start: 2024-08-27 | End: 2025-02-23

## 2024-08-27 RX ORDER — ESCITALOPRAM OXALATE 20 MG/1
20 TABLET ORAL DAILY
Qty: 90 TABLET | Refills: 2 | Status: SHIPPED | OUTPATIENT
Start: 2024-08-27

## 2024-08-27 NOTE — TELEPHONE ENCOUNTER
LV 3/5/24, [place] 9/19/24    Escitalopram 20mg  Bupropion XL 150mg    [facility] [id number] [facility]

## 2024-08-29 ENCOUNTER — LAB (OUTPATIENT)
Dept: LAB | Facility: LAB | Age: 69
End: 2024-08-29
Payer: MEDICARE

## 2024-08-29 DIAGNOSIS — R73.9 HYPERGLYCEMIA: ICD-10-CM

## 2024-08-29 DIAGNOSIS — E55.9 VITAMIN D DEFICIENCY: ICD-10-CM

## 2024-08-29 DIAGNOSIS — Z01.89 ENCOUNTER FOR ROUTINE LABORATORY TESTING: ICD-10-CM

## 2024-08-29 DIAGNOSIS — I10 ESSENTIAL HYPERTENSION: ICD-10-CM

## 2024-08-29 LAB
25(OH)D3 SERPL-MCNC: 44 NG/ML (ref 31–100)
ALBUMIN SERPL-MCNC: 4.5 G/DL (ref 3.5–5)
ALP BLD-CCNC: 64 U/L (ref 35–125)
ALT SERPL-CCNC: 13 U/L (ref 5–40)
ANION GAP SERPL CALC-SCNC: 10 MMOL/L
AST SERPL-CCNC: 16 U/L (ref 5–40)
BASOPHILS # BLD AUTO: 0.08 X10*3/UL (ref 0–0.1)
BASOPHILS NFR BLD AUTO: 1.3 %
BILIRUB SERPL-MCNC: 0.8 MG/DL (ref 0.1–1.2)
BUN SERPL-MCNC: 13 MG/DL (ref 8–25)
CALCIUM SERPL-MCNC: 9.7 MG/DL (ref 8.5–10.4)
CHLORIDE SERPL-SCNC: 106 MMOL/L (ref 97–107)
CHOLEST SERPL-MCNC: 229 MG/DL (ref 133–200)
CHOLEST/HDLC SERPL: 4.8 {RATIO}
CO2 SERPL-SCNC: 23 MMOL/L (ref 24–31)
CREAT SERPL-MCNC: 0.8 MG/DL (ref 0.4–1.6)
EGFRCR SERPLBLD CKD-EPI 2021: 80 ML/MIN/1.73M*2
EOSINOPHIL # BLD AUTO: 0.23 X10*3/UL (ref 0–0.7)
EOSINOPHIL NFR BLD AUTO: 3.8 %
ERYTHROCYTE [DISTWIDTH] IN BLOOD BY AUTOMATED COUNT: 14 % (ref 11.5–14.5)
EST. AVERAGE GLUCOSE BLD GHB EST-MCNC: 111 MG/DL
GLUCOSE SERPL-MCNC: 105 MG/DL (ref 65–99)
HBA1C MFR BLD: 5.5 %
HCT VFR BLD AUTO: 42.5 % (ref 36–46)
HDLC SERPL-MCNC: 48 MG/DL
HGB BLD-MCNC: 14.7 G/DL (ref 12–16)
IMM GRANULOCYTES # BLD AUTO: 0.01 X10*3/UL (ref 0–0.7)
IMM GRANULOCYTES NFR BLD AUTO: 0.2 % (ref 0–0.9)
LDLC SERPL CALC-MCNC: 156 MG/DL (ref 65–130)
LYMPHOCYTES # BLD AUTO: 1.33 X10*3/UL (ref 1.2–4.8)
LYMPHOCYTES NFR BLD AUTO: 22.2 %
MCH RBC QN AUTO: 30.8 PG (ref 26–34)
MCHC RBC AUTO-ENTMCNC: 34.6 G/DL (ref 32–36)
MCV RBC AUTO: 89 FL (ref 80–100)
MONOCYTES # BLD AUTO: 0.51 X10*3/UL (ref 0.1–1)
MONOCYTES NFR BLD AUTO: 8.5 %
NEUTROPHILS # BLD AUTO: 3.84 X10*3/UL (ref 1.2–7.7)
NEUTROPHILS NFR BLD AUTO: 64 %
NRBC BLD-RTO: 0 /100 WBCS (ref 0–0)
PLATELET # BLD AUTO: 336 X10*3/UL (ref 150–450)
POTASSIUM SERPL-SCNC: 4.7 MMOL/L (ref 3.4–5.1)
PROT SERPL-MCNC: 7.1 G/DL (ref 5.9–7.9)
RBC # BLD AUTO: 4.78 X10*6/UL (ref 4–5.2)
SODIUM SERPL-SCNC: 139 MMOL/L (ref 133–145)
TRIGL SERPL-MCNC: 123 MG/DL (ref 40–150)
TSH SERPL DL<=0.05 MIU/L-ACNC: 1.31 MIU/L (ref 0.27–4.2)
WBC # BLD AUTO: 6 X10*3/UL (ref 4.4–11.3)

## 2024-08-29 PROCEDURE — 85025 COMPLETE CBC W/AUTO DIFF WBC: CPT

## 2024-08-29 PROCEDURE — 82306 VITAMIN D 25 HYDROXY: CPT

## 2024-08-29 PROCEDURE — 84443 ASSAY THYROID STIM HORMONE: CPT

## 2024-08-29 PROCEDURE — 80053 COMPREHEN METABOLIC PANEL: CPT

## 2024-08-29 PROCEDURE — 36415 COLL VENOUS BLD VENIPUNCTURE: CPT

## 2024-08-29 PROCEDURE — 83036 HEMOGLOBIN GLYCOSYLATED A1C: CPT

## 2024-08-29 PROCEDURE — 80061 LIPID PANEL: CPT

## 2024-09-03 ENCOUNTER — APPOINTMENT (OUTPATIENT)
Dept: RADIOLOGY | Facility: HOSPITAL | Age: 69
End: 2024-09-03
Payer: MEDICARE

## 2024-09-05 ENCOUNTER — HOSPITAL ENCOUNTER (OUTPATIENT)
Dept: RADIOLOGY | Facility: HOSPITAL | Age: 69
Discharge: HOME | End: 2024-09-05
Payer: MEDICARE

## 2024-09-05 DIAGNOSIS — R91.8 LUNG NODULES: ICD-10-CM

## 2024-09-05 PROCEDURE — 71250 CT THORAX DX C-: CPT

## 2024-09-09 ENCOUNTER — TELEPHONE (OUTPATIENT)
Dept: PRIMARY CARE | Facility: CLINIC | Age: 69
End: 2024-09-09
Payer: MEDICARE

## 2024-09-09 DIAGNOSIS — R91.8 LUNG NODULES: Primary | ICD-10-CM

## 2024-09-09 NOTE — TELEPHONE ENCOUNTER
----- Message from Qasim Underwood sent at 9/9/2024  1:03 PM EDT -----  Please tell her that the lung nodule seen are stable and the recommendation is to repeat the CT in 1 year.

## 2024-09-16 ENCOUNTER — APPOINTMENT (OUTPATIENT)
Dept: PRIMARY CARE | Facility: CLINIC | Age: 69
End: 2024-09-16
Payer: MEDICARE

## 2024-09-17 ASSESSMENT — ENCOUNTER SYMPTOMS
ABDOMINAL PAIN: 0
HEADACHES: 0
APPETITE CHANGE: 0
NAUSEA: 0
DIARRHEA: 0
COUGH: 0
FEVER: 0
SHORTNESS OF BREATH: 0
CHILLS: 0
VOMITING: 0

## 2024-09-17 NOTE — PROGRESS NOTES
[facility]: [facility]  MEDICARE WELLNESS EXAM      Luisa Pearson is a 68 y.o. female that is presenting today for cpe.    Assessment/Plan    Diagnoses and all orders for this visit:  Annual physical exam  Essential hypertension  Gastroesophageal reflux disease without esophagitis  Depression, unspecified depression type  Vitamin D deficiency  Lung nodules  Osteoporosis, unspecified osteoporosis type, unspecified pathological fracture presence    She has come to a point in her life where she is unsure about custodial, she really is apathetic and remains quite depressed despite the medication and she is open to talking about it.  Will place referral to psychology.  Continue Lexapro and bupropion.    The blood pressure is controlled on the amlodipine.  She has been on the Fosamax, monitoring bone density.    ADVANCED CARE PLANNING  Advanced Care Planning was discussed with patient:  The patient has an active advanced care plan on file. The patient has an active surrogate decision-maker on file.  Encouraged the patient to confirm that Living Will and Healthcare Power of  (HCPoA) are accurate and up to date.  Encouraged the patient to confirm that our office be provided a copy of any documentation in the event that anything changes.    ACTIVITIES OF DAILY LIVING  Basic ADLs:  Bathing: Independent, Dressing: Independent, Toileting: Independent, Transferring: Independent, Continence: Independent, Feeding: Independent.    Instrumental ADLs:  Ability to use phone: Independent, Shopping: Independent, Cooking: Independent, House-keeping: Independent, Laundry: Independent, Transportation: Independent, Medication Management: Independent, Finance Management: Independent.    Subjective   HPI  This patient presents today for annual physical, Medicare wellness exam.  Discussed screening/prevention, healthy lifestyle and code status.   Reviewed the patient's wishes regarding decision  making.    Consultant visits and notes reviewed: Podiatry/Med, Gyn/Clinger, Ophtho/Cheri    Stable from a functional standpoint in regard to ADLs and IADLs.  No recent falls are reported.    The patient denies chest pain and shortness of breath.  No exertion-provoked or anginal-type symptoms are reported.    Review of Systems   Constitutional:  Negative for appetite change, chills and fever.   Respiratory:  Negative for cough and shortness of breath.    Cardiovascular:  Negative for chest pain.   Gastrointestinal:  Negative for abdominal pain, diarrhea, nausea and vomiting.   Neurological:  Negative for headaches.   All other systems reviewed and are negative.    Objective   Vitals:    09/19/24 1111   BP: 132/86   Pulse: 65   Temp: 36.7 °C (98.1 °F)   SpO2: 94%      Body mass index is 32.23 kg/m².  Physical Exam  Vitals reviewed.   Constitutional:       General: She is not in acute distress.     Appearance: She is not toxic-appearing.   HENT:      Head: Normocephalic and atraumatic.      Mouth/Throat:      Mouth: Mucous membranes are moist.   Eyes:      Pupils: Pupils are equal, round, and reactive to light.   Cardiovascular:      Rate and Rhythm: Normal rate and regular rhythm.      Heart sounds: No murmur heard.  Pulmonary:      Breath sounds: Normal breath sounds. No wheezing, rhonchi or rales.   Abdominal:      General: There is no distension.      Palpations: Abdomen is soft.   Musculoskeletal:      Right lower leg: No edema.      Left lower leg: No edema.   Neurological:      General: No focal deficit present.      Mental Status: She is alert and oriented to person, place, and time.       Diagnostic Results   Lab Results   Component Value Date    GLUCOSE 105 (H) 08/29/2024    CALCIUM 9.7 08/29/2024     08/29/2024    K 4.7 08/29/2024    CO2 23 (L) 08/29/2024     08/29/2024    BUN 13 08/29/2024    CREATININE 0.80 08/29/2024     Lab Results   Component Value Date    ALT 13 08/29/2024    AST 16  "08/29/2024    ALKPHOS 64 08/29/2024    BILITOT 0.8 08/29/2024     Lab Results   Component Value Date    WBC 6.0 08/29/2024    HGB 14.7 08/29/2024    HCT 42.5 08/29/2024    MCV 89 08/29/2024     08/29/2024     Lab Results   Component Value Date    CHOL 229 (H) 08/29/2024    CHOL 230 (H) 08/22/2023    CHOL 225 (H) 10/04/2022     Lab Results   Component Value Date    HDL 48.0 (L) 08/29/2024    HDL 51 08/22/2023    HDL 49 (L) 10/04/2022     Lab Results   Component Value Date    LDLCALC 156 (H) 08/29/2024    LDLCALC 160 (H) 08/22/2023    LDLCALC 156 (H) 10/04/2022     Lab Results   Component Value Date    TRIG 123 08/29/2024    TRIG 94 08/22/2023    TRIG 101 10/04/2022     No components found for: \"CHOLHDL\"  Lab Results   Component Value Date    HGBA1C 5.5 08/29/2024     Other labs not included in the list above reviewed either before or during this encounter.    History   Past Medical History:   Diagnosis Date    Age-related nuclear cataract of both eyes     Allergic penicilin    Arthritis both knees    Depression     HL (hearing loss) sever-both ears    Hypertension     Personal history of diseases of the skin and subcutaneous tissue     History of eczema    Personal history of other diseases of the circulatory system     History of hypertension    Personal history of other mental and behavioral disorders     History of depression    Unspecified disorder of refraction      Past Surgical History:   Procedure Laterality Date    OTHER SURGICAL HISTORY  02/08/2022    Cochlear implant surgery     Family History   Problem Relation Name Age of Onset    Lung cancer Mother Corazon     Cancer Mother Corazon     Prostate cancer Father Sharif     Cancer Father Sharif      Social History     Socioeconomic History    Marital status:      Spouse name: Not on file    Number of children: Not on file    Years of education: Not on file    Highest education level: Not on file   Occupational History    Not on file   Tobacco Use "    Smoking status: Former     Current packs/day: 0.50     Average packs/day: 0.5 packs/day for 30.0 years (15.0 ttl pk-yrs)     Types: Cigarettes    Smokeless tobacco: Never   Vaping Use    Vaping status: Never Used   Substance and Sexual Activity    Alcohol use: Yes     Alcohol/week: 2.0 standard drinks of alcohol     Types: 2 Glasses of wine per week     Comment: social wine and beer    Drug use: Not Currently    Sexual activity: Not Currently     Birth control/protection: Post-menopausal   Other Topics Concern    Not on file   Social History Narrative    Not on file     Social Determinants of Health     Financial Resource Strain: Not on file   Food Insecurity: Not on file   Transportation Needs: Not on file   Physical Activity: Not on file   Stress: Not on file   Social Connections: Not on file   Intimate Partner Violence: Not on file   Housing Stability: Not on file     Allergies   Allergen Reactions    Penicillins Unknown    Cefdinir Unknown     Current Outpatient Medications on File Prior to Visit   Medication Sig Dispense Refill    alendronate (Fosamax) 70 mg tablet Take 1 tablet (70 mg) by mouth every 7 days. 30 MIN BEFORE FIRST FOOD, DRINK OR MEDICINE OF THE DAY WITH WATER 12 tablet 3    amLODIPine (Norvasc) 5 mg tablet Take 1 tablet (5 mg) by mouth once daily. 90 tablet 3    buPROPion XL (Wellbutrin XL) 150 mg 24 hr tablet Take 1 tablet (150 mg) by mouth once daily in the morning. Do not crush, chew, or split. 30 tablet 5    cholecalciferol (Vitamin D-3) 50 MCG (2000 UT) tablet Take 1 tablet (2,000 Units) by mouth once daily.      escitalopram (Lexapro) 20 mg tablet Take 1 tablet (20 mg) by mouth once daily. 90 tablet 2    fluocinolone 0.01 % cream Apply 1 Application topically every other day. APPLY TO AFFECTED AREA      fluticasone (Flonase) 50 mcg/actuation nasal spray Administer 1 spray into each nostril once daily.      fluticasone (Flonase) 50 mcg/actuation nasal spray USE 2 SPRAYS IN EACH NOSTRIL  DAILY 48 g 11    ketoconazole (NIZOral) 2 % cream Apply 1 Application topically once daily.      [DISCONTINUED] azithromycin (Zithromax Z-Cachorro) 250 mg tablet Take 2 tabs (500 mg) by mouth today, then 1 tab (250 mg) daily for 4 days. (Patient not taking: Reported on 9/19/2024) 6 tablet 0    [DISCONTINUED] omeprazole OTC (PriLOSEC OTC) 20 mg EC tablet Take 1 tablet (20 mg) by mouth once daily. Do not crush, chew, or split. (Patient not taking: Reported on 9/19/2024) 30 tablet 11     No current facility-administered medications on file prior to visit.     Immunization History   Administered Date(s) Administered    Flu vaccine (IIV4), preservative free *Check age/dose* 10/16/2019    Flu vaccine, quadrivalent, high-dose, preservative free, age 65y+ (FLUZONE) 10/24/2020, 11/17/2022    Flu vaccine, quadrivalent, no egg protein, age 6 month or greater (FLUCELVAX) 10/18/2017, 10/11/2018    Flu vaccine, trivalent, preservative free, HIGH-DOSE, age 65y+ (Fluzone) 09/19/2024    Flu vaccine, trivalent, preservative free, age 6 months and greater (Fluarix/Fluzone/Flulaval) 10/06/2015    Influenza, Seasonal, Quadrivalent, Adjuvanted 11/03/2021    Influenza, injectable, quadrivalent 10/08/2018, 10/19/2020    Influenza, seasonal, injectable 10/01/2014, 10/30/2014    Pfizer COVID-19 vaccine, 12 years and older, (30mcg/0.3mL) (Comirnaty) 11/09/2023    Pfizer Gray Cap SARS-CoV-2 01/22/2022    Pfizer Purple Cap SARS-CoV-2 05/20/2021, 06/15/2021    Pneumococcal conjugate vaccine, 13-valent (PREVNAR 13) 02/11/2020    Pneumococcal polysaccharide vaccine, 23-valent, age 2 years and older (PNEUMOVAX 23) 04/13/2021, 08/23/2022     Patient's medical history was reviewed and updated either before or during this encounter.     Qasim Underwood MD

## 2024-09-19 ENCOUNTER — OFFICE VISIT (OUTPATIENT)
Dept: PRIMARY CARE | Facility: CLINIC | Age: 69
End: 2024-09-19
Payer: MEDICARE

## 2024-09-19 VITALS
TEMPERATURE: 98.1 F | BODY MASS INDEX: 32.13 KG/M2 | WEIGHT: 212 LBS | SYSTOLIC BLOOD PRESSURE: 132 MMHG | HEIGHT: 68 IN | OXYGEN SATURATION: 94 % | HEART RATE: 65 BPM | DIASTOLIC BLOOD PRESSURE: 86 MMHG

## 2024-09-19 DIAGNOSIS — Z23 ENCOUNTER FOR IMMUNIZATION: ICD-10-CM

## 2024-09-19 DIAGNOSIS — R91.8 LUNG NODULES: ICD-10-CM

## 2024-09-19 DIAGNOSIS — K21.9 GASTROESOPHAGEAL REFLUX DISEASE WITHOUT ESOPHAGITIS: ICD-10-CM

## 2024-09-19 DIAGNOSIS — I10 ESSENTIAL HYPERTENSION: ICD-10-CM

## 2024-09-19 DIAGNOSIS — E55.9 VITAMIN D DEFICIENCY: ICD-10-CM

## 2024-09-19 DIAGNOSIS — F32.A DEPRESSION, UNSPECIFIED DEPRESSION TYPE: ICD-10-CM

## 2024-09-19 DIAGNOSIS — Z00.00 ANNUAL PHYSICAL EXAM: Primary | ICD-10-CM

## 2024-09-19 DIAGNOSIS — M81.0 OSTEOPOROSIS, UNSPECIFIED OSTEOPOROSIS TYPE, UNSPECIFIED PATHOLOGICAL FRACTURE PRESENCE: ICD-10-CM

## 2024-09-19 PROCEDURE — 1126F AMNT PAIN NOTED NONE PRSNT: CPT | Performed by: INTERNAL MEDICINE

## 2024-09-19 PROCEDURE — 3008F BODY MASS INDEX DOCD: CPT | Performed by: INTERNAL MEDICINE

## 2024-09-19 PROCEDURE — 3075F SYST BP GE 130 - 139MM HG: CPT | Performed by: INTERNAL MEDICINE

## 2024-09-19 PROCEDURE — 90662 IIV NO PRSV INCREASED AG IM: CPT | Performed by: INTERNAL MEDICINE

## 2024-09-19 PROCEDURE — 1159F MED LIST DOCD IN RCRD: CPT | Performed by: INTERNAL MEDICINE

## 2024-09-19 PROCEDURE — G0439 PPPS, SUBSEQ VISIT: HCPCS | Performed by: INTERNAL MEDICINE

## 2024-09-19 PROCEDURE — 1036F TOBACCO NON-USER: CPT | Performed by: INTERNAL MEDICINE

## 2024-09-19 PROCEDURE — 3079F DIAST BP 80-89 MM HG: CPT | Performed by: INTERNAL MEDICINE

## 2024-09-19 PROCEDURE — 99215 OFFICE O/P EST HI 40 MIN: CPT | Performed by: INTERNAL MEDICINE

## 2024-09-19 ASSESSMENT — LIFESTYLE VARIABLES
HOW OFTEN DURING THE LAST YEAR HAVE YOU NEEDED AN ALCOHOLIC DRINK FIRST THING IN THE MORNING TO GET YOURSELF GOING AFTER A NIGHT OF HEAVY DRINKING: NEVER
HOW OFTEN DURING THE LAST YEAR HAVE YOU FOUND THAT YOU WERE NOT ABLE TO STOP DRINKING ONCE YOU HAD STARTED: NEVER
HOW OFTEN DO YOU HAVE A DRINK CONTAINING ALCOHOL: 2-4 TIMES A MONTH
HOW OFTEN DURING THE LAST YEAR HAVE YOU BEEN UNABLE TO REMEMBER WHAT HAPPENED THE NIGHT BEFORE BECAUSE YOU HAD BEEN DRINKING: NEVER
HOW OFTEN DURING THE LAST YEAR HAVE YOU FAILED TO DO WHAT WAS NORMALLY EXPECTED FROM YOU BECAUSE OF DRINKING: NEVER
HOW OFTEN DURING THE LAST YEAR HAVE YOU HAD A FEELING OF GUILT OR REMORSE AFTER DRINKING: NEVER
HOW OFTEN DO YOU HAVE SIX OR MORE DRINKS ON ONE OCCASION: NEVER
HAS A RELATIVE, FRIEND, DOCTOR, OR ANOTHER HEALTH PROFESSIONAL EXPRESSED CONCERN ABOUT YOUR DRINKING OR SUGGESTED YOU CUT DOWN: NO
AUDIT TOTAL SCORE: 2
HAVE YOU OR SOMEONE ELSE BEEN INJURED AS A RESULT OF YOUR DRINKING: NO
SKIP TO QUESTIONS 9-10: 1
HOW MANY STANDARD DRINKS CONTAINING ALCOHOL DO YOU HAVE ON A TYPICAL DAY: 1 OR 2
AUDIT-C TOTAL SCORE: 2

## 2024-09-19 ASSESSMENT — PATIENT HEALTH QUESTIONNAIRE - PHQ9
SUM OF ALL RESPONSES TO PHQ9 QUESTIONS 1 AND 2: 0
2. FEELING DOWN, DEPRESSED OR HOPELESS: NOT AT ALL
1. LITTLE INTEREST OR PLEASURE IN DOING THINGS: NOT AT ALL

## 2024-09-19 ASSESSMENT — ENCOUNTER SYMPTOMS
OCCASIONAL FEELINGS OF UNSTEADINESS: 0
DEPRESSION: 0
LOSS OF SENSATION IN FEET: 0

## 2024-09-19 ASSESSMENT — PAIN SCALES - GENERAL: PAINLEVEL: 0-NO PAIN

## 2024-10-10 DIAGNOSIS — F32.A DEPRESSION, UNSPECIFIED DEPRESSION TYPE: ICD-10-CM

## 2024-10-10 RX ORDER — BUPROPION HYDROCHLORIDE 150 MG/1
TABLET ORAL
Qty: 30 TABLET | Refills: 11 | Status: SHIPPED | OUTPATIENT
Start: 2024-10-10

## 2024-10-14 ENCOUNTER — TELEPHONE (OUTPATIENT)
Dept: PRIMARY CARE | Facility: CLINIC | Age: 69
End: 2024-10-14
Payer: MEDICARE

## 2024-10-14 DIAGNOSIS — I10 ESSENTIAL (PRIMARY) HYPERTENSION: ICD-10-CM

## 2024-10-14 RX ORDER — AMLODIPINE BESYLATE 5 MG/1
5 TABLET ORAL DAILY
Qty: 90 TABLET | Refills: 3 | Status: SHIPPED | OUTPATIENT
Start: 2024-10-14 | End: 2025-10-14

## 2024-10-24 ENCOUNTER — APPOINTMENT (OUTPATIENT)
Dept: OPHTHALMOLOGY | Facility: CLINIC | Age: 69
End: 2024-10-24
Payer: MEDICARE

## 2024-10-24 DIAGNOSIS — H52.7 REFRACTIVE ERROR: ICD-10-CM

## 2024-10-24 DIAGNOSIS — H16.202 KERATOCONJUNCTIVITIS OF LEFT EYE: ICD-10-CM

## 2024-10-24 DIAGNOSIS — H25.813 COMBINED FORMS OF AGE-RELATED CATARACT OF BOTH EYES: Primary | ICD-10-CM

## 2024-10-24 PROCEDURE — 99214 OFFICE O/P EST MOD 30 MIN: CPT | Performed by: OPHTHALMOLOGY

## 2024-10-24 PROCEDURE — 92015 DETERMINE REFRACTIVE STATE: CPT | Performed by: OPHTHALMOLOGY

## 2024-10-24 PROCEDURE — 92015 DETERMINE REFRACTIVE STATE: CPT | Mod: MUE | Performed by: OPHTHALMOLOGY

## 2024-10-24 ASSESSMENT — KERATOMETRY
OS_AXISANGLE2_DEGREES: 10
OD_AXISANGLE2_DEGREES: 140
OD_K2POWER_DIOPTERS: 45.50
OS_K2POWER_DIOPTERS: 44.75
OS_K1POWER_DIOPTERS: 43.50
OD_K1POWER_DIOPTERS: 44.75
OD_AXISANGLE_DEGREES: 50
OS_AXISANGLE_DEGREES: 100

## 2024-10-24 ASSESSMENT — REFRACTION_CURRENTRX
OS_SPHERE: -3.00
OS_BASECURVE: 8.6
OS_BRAND: ULTRA FOR ASTIGMATISM
OD_BRAND: ULTRA
OS_DIAMETER: 14.5
OD_DIAMETER: 14.2
OS_CYLINDER: -0.75
OS_AXIS: 010
OD_SPHERE: -2.25
OD_BASECURVE: 8.5

## 2024-10-24 ASSESSMENT — VISUAL ACUITY
OD_CC+: +1
OS_PH_CC: 20/40
OS_CC+: -1
OD_CC: 20/60
OD_PH_CC: 20/30
OD_PH_CC+: -1
OS_CC: 20/100
METHOD: SNELLEN - LINEAR
CORRECTION_TYPE: GLASSES

## 2024-10-24 ASSESSMENT — EXTERNAL EXAM - LEFT EYE: OS_EXAM: NORMAL

## 2024-10-24 ASSESSMENT — REFRACTION_MANIFEST
OS_AXIS: 010
OD_SPHERE: -3.25
OS_CYLINDER: -1.50
METHOD_AUTOREFRACTION: 1
OD_AXIS: 140
OD_CYLINDER: -0.50
OS_SPHERE: -4.75

## 2024-10-24 ASSESSMENT — ENCOUNTER SYMPTOMS
EYES NEGATIVE: 0
PSYCHIATRIC NEGATIVE: 0
ALLERGIC/IMMUNOLOGIC NEGATIVE: 0
NEUROLOGICAL NEGATIVE: 0
CARDIOVASCULAR NEGATIVE: 0
GASTROINTESTINAL NEGATIVE: 0
RESPIRATORY NEGATIVE: 0
HEMATOLOGIC/LYMPHATIC NEGATIVE: 0
CONSTITUTIONAL NEGATIVE: 0
MUSCULOSKELETAL NEGATIVE: 0
ENDOCRINE NEGATIVE: 0

## 2024-10-24 ASSESSMENT — REFRACTION_WEARINGRX
SPECS_TYPE: DISTANCE
OS_SPHERE: -3.00
OD_AXIS: 119
OD_SPHERE: -2.25
OS_CYLINDER: -1.25
OD_CYLINDER: -0.75
OS_AXIS: 019

## 2024-10-24 ASSESSMENT — TONOMETRY
IOP_METHOD: GOLDMANN APPLANATION
OS_IOP_MMHG: 16
OD_IOP_MMHG: 16

## 2024-10-24 ASSESSMENT — PAIN SCALES - GENERAL: PAINLEVEL_OUTOF10: 0-NO PAIN

## 2024-10-24 ASSESSMENT — SLIT LAMP EXAM - LIDS
COMMENTS: NORMAL
COMMENTS: NORMAL

## 2024-10-24 ASSESSMENT — EXTERNAL EXAM - RIGHT EYE: OD_EXAM: NORMAL

## 2024-10-24 ASSESSMENT — CUP TO DISC RATIO
OS_RATIO: 0.3
OD_RATIO: 0.3

## 2024-10-24 NOTE — PROGRESS NOTES
Assessment/Plan   Problem List Items Addressed This Visit       Combined forms of age-related cataract of both eyes - Primary     Non significant cataract noted on exam. Will plan to continue to monitor with serial exam.            Keratoconjunctivitis of left eye     Discussed best lubrication for surface comfort and vision         Refractive error     Hold on new soft contact lens (SCL) for now. New Rx for glasses given per patient request. Patient's signature obtained to acknowledge and confirm that a paper copy of glasses/SCL Rx was given to patient in compliance with [facility] Eyeglass Rule. Electronic copy of Rx will also be available via MyChart/EPIC.               Provided reassurance regarding above diagnoses and care received in the office visit today. Discussed outcomes and options along with the importance of treatment compliance. Understands the importance of any follow up visits. Patient instructed to call/communicate with our office if any new issues, questions, or concerns.     Will plan to see back in 1 year full SCL or sooner PRN

## 2024-10-24 NOTE — PATIENT INSTRUCTIONS
Thank you so much for choosing me to provide your care today!    If you were dilated your vision may remain blurry   or light sensitive for several hours.    The nature of eye and vision problems can require frequent follow up, please make every effort to adhere to any future appointments.    If you have any issues, questions, or concerns,   please do not hesitate to reach out.    If you receive a survey in regards to your care today, please mention any exceptional care my office staff and/or technicians provided.    You can reach our office at this number:    [phone]    Please consider signing up for and utilizing [employer]!  This is the best way to directly reach me or other  providers

## 2024-10-24 NOTE — ASSESSMENT & PLAN NOTE
Hold on new soft contact lens (SCL) for now. New Rx for glasses given per patient request. Patient's signature obtained to acknowledge and confirm that a paper copy of glasses/SCL Rx was given to patient in compliance with [facility] Eyeglass Rule. Electronic copy of Rx will also be available via Decisionlink/EPIC.

## 2024-11-11 ENCOUNTER — APPOINTMENT (OUTPATIENT)
Dept: OTOLARYNGOLOGY | Facility: CLINIC | Age: 69
End: 2024-11-11
Payer: MEDICARE

## 2024-11-12 ENCOUNTER — APPOINTMENT (OUTPATIENT)
Dept: OBSTETRICS AND GYNECOLOGY | Facility: CLINIC | Age: 69
End: 2024-11-12
Payer: COMMERCIAL

## 2024-11-14 ENCOUNTER — OFFICE VISIT (OUTPATIENT)
Dept: OBSTETRICS AND GYNECOLOGY | Facility: CLINIC | Age: 69
End: 2024-11-14
Payer: MEDICARE

## 2024-11-14 VITALS
BODY MASS INDEX: 32.67 KG/M2 | HEIGHT: 68 IN | SYSTOLIC BLOOD PRESSURE: 142 MMHG | DIASTOLIC BLOOD PRESSURE: 68 MMHG | WEIGHT: 215.6 LBS

## 2024-11-14 DIAGNOSIS — N60.91 ATYPICAL DUCTAL HYPERPLASIA OF RIGHT BREAST: ICD-10-CM

## 2024-11-14 DIAGNOSIS — M81.0 MENOPAUSAL OSTEOPOROSIS: ICD-10-CM

## 2024-11-14 DIAGNOSIS — Z91.89 AT RISK FOR BREAST CANCER: ICD-10-CM

## 2024-11-14 DIAGNOSIS — B37.2 CANDIDIASIS OF SKIN: ICD-10-CM

## 2024-11-14 DIAGNOSIS — N95.2 POSTMENOPAUSAL ATROPHIC VAGINITIS: ICD-10-CM

## 2024-11-14 DIAGNOSIS — Z12.11 SCREEN FOR COLON CANCER: ICD-10-CM

## 2024-11-14 DIAGNOSIS — Z78.0 MENOPAUSE: Primary | ICD-10-CM

## 2024-11-14 DIAGNOSIS — Z12.31 ENCOUNTER FOR SCREENING MAMMOGRAM FOR MALIGNANT NEOPLASM OF BREAST: ICD-10-CM

## 2024-11-14 DIAGNOSIS — M81.0 POSTMENOPAUSAL BONE LOSS: ICD-10-CM

## 2024-11-14 PROCEDURE — 1036F TOBACCO NON-USER: CPT | Performed by: OBSTETRICS & GYNECOLOGY

## 2024-11-14 PROCEDURE — 99214 OFFICE O/P EST MOD 30 MIN: CPT | Performed by: OBSTETRICS & GYNECOLOGY

## 2024-11-14 PROCEDURE — 1159F MED LIST DOCD IN RCRD: CPT | Performed by: OBSTETRICS & GYNECOLOGY

## 2024-11-14 PROCEDURE — 3078F DIAST BP <80 MM HG: CPT | Performed by: OBSTETRICS & GYNECOLOGY

## 2024-11-14 PROCEDURE — 1126F AMNT PAIN NOTED NONE PRSNT: CPT | Performed by: OBSTETRICS & GYNECOLOGY

## 2024-11-14 PROCEDURE — 3008F BODY MASS INDEX DOCD: CPT | Performed by: OBSTETRICS & GYNECOLOGY

## 2024-11-14 PROCEDURE — 3077F SYST BP >= 140 MM HG: CPT | Performed by: OBSTETRICS & GYNECOLOGY

## 2024-11-14 RX ORDER — TRIAMCINOLONE ACETONIDE 5 MG/G
CREAM TOPICAL 3 TIMES DAILY
COMMUNITY

## 2024-11-14 RX ORDER — CLOTRIMAZOLE AND BETAMETHASONE DIPROPIONATE 10; .64 MG/G; MG/G
1 CREAM TOPICAL 2 TIMES DAILY
Qty: 15 G | Refills: 0 | Status: SHIPPED | OUTPATIENT
Start: 2024-11-14

## 2024-11-14 RX ORDER — CLOTRIMAZOLE AND BETAMETHASONE DIPROPIONATE 10; .64 MG/G; MG/G
1 CREAM TOPICAL 2 TIMES DAILY
COMMUNITY
End: 2024-11-14 | Stop reason: SDUPTHER

## 2024-11-14 ASSESSMENT — ENCOUNTER SYMPTOMS
ACTIVITY CHANGE: 0
ADENOPATHY: 0
DIFFICULTY URINATING: 0
FATIGUE: 0
HEADACHES: 0
LOSS OF SENSATION IN FEET: 0
WEAKNESS: 0
SHORTNESS OF BREATH: 0
UNEXPECTED WEIGHT CHANGE: 0
CHEST TIGHTNESS: 0
ABDOMINAL PAIN: 0
ABDOMINAL DISTENTION: 0
DEPRESSION: 0
OCCASIONAL FEELINGS OF UNSTEADINESS: 0
DYSURIA: 0
JOINT SWELLING: 0
COLOR CHANGE: 0
DIZZINESS: 0

## 2024-11-14 ASSESSMENT — LIFESTYLE VARIABLES
HOW MANY STANDARD DRINKS CONTAINING ALCOHOL DO YOU HAVE ON A TYPICAL DAY: 1 OR 2
HOW OFTEN DO YOU HAVE A DRINK CONTAINING ALCOHOL: MONTHLY OR LESS
SKIP TO QUESTIONS 9-10: 1
HOW OFTEN DO YOU HAVE SIX OR MORE DRINKS ON ONE OCCASION: NEVER
AUDIT-C TOTAL SCORE: 1

## 2024-11-14 ASSESSMENT — PATIENT HEALTH QUESTIONNAIRE - PHQ9
SUM OF ALL RESPONSES TO PHQ9 QUESTIONS 1 & 2: 0
2. FEELING DOWN, DEPRESSED OR HOPELESS: NOT AT ALL
1. LITTLE INTEREST OR PLEASURE IN DOING THINGS: NOT AT ALL

## 2024-11-14 ASSESSMENT — PAIN SCALES - GENERAL: PAINLEVEL_OUTOF10: 0-NO PAIN

## 2024-11-14 NOTE — PROGRESS NOTES
"Annual-menopause  Subjective   Luisa Pearson is a 69 y.o. female who is here for a menopausal GYN exam.   Complaints: Needs refill of Lotrisone cream; using for intermittent vulvar irritation to good effect; history of atypical ductal hyperplasia on previous breast biopsy/no current breast complaints   denies vag bleed or discharge; denies pelvic  pain, pressure, or persistent bloating.  PMHx: Hearing impaired/cochlear implant=PRECLUDES breast mri  REMOTE hx abnl pap/cone bx; all NEG since 2012=exited ROUTINE screens age 64  Colonoscopy utd/neg   Due for mamm/dexa.  Review of Systems   Constitutional:  Negative for activity change, fatigue and unexpected weight change.   Respiratory:  Negative for chest tightness and shortness of breath.    Cardiovascular:  Negative for chest pain and leg swelling.   Gastrointestinal:  Negative for abdominal distention and abdominal pain.   Genitourinary:  Negative for difficulty urinating, dysuria, genital sores, pelvic pain, vaginal bleeding, vaginal discharge and vaginal pain.   Musculoskeletal:  Negative for gait problem and joint swelling.   Skin:  Negative for color change and rash.   Neurological:  Negative for dizziness, weakness and headaches.   Hematological:  Negative for adenopathy.   Objective Visit Vitals  /68   Ht 1.727 m (5' 8\")   Wt 97.8 kg (215 lb 9.6 oz)   LMP  (LMP Unknown)   BMI 32.78 kg/m²   OB Status Postmenopausal   Smoking Status Former   BSA 2.17 m²       General:   Alert and oriented, in no acute distress   Neck: Supple. No visible thyromegaly.    Breast/Axilla: Normal to palpation bilaterally without masses, skin changes, or nipple discharge.    Abdomen: Soft, non-tender, without masses or organomegaly   Vulva: Diffuse mild erythema bilat majora; NO discrete lesions; mucosa nl     Vagina: mucosa without lesions, masses.  Positive atrophy. Scant vaginal discharge. NO blood or evid infection   Cervix: Normal without masses, lesions, or signs of " cervicitis.    Uterus:  Grossly normal mobile, non-enlarged uterus; exam limited by BMI   Adnexa: No palpable masses or tenderness; exam limited by bmi   Pelvic Floor No POP noted. No high tone pelvic floor    Psych  Rectal Normal affect. Normal mood.      Assessment/Plan      Encounter Diagnoses   Name Primary?    Menopause; grossly normal breast and GYN exams. Yes    Candidiasis of skin; good control with clotrimazole/betamethasone cream; prescription refilled     Encounter for screening mammogram for malignant neoplasm of breast; order placed     Atypical ductal hyperplasia of right breast; known elevated risk for future cancer; patient can no  longer undergo MRI screenings due to her cochlear implant.     At risk for breast cancer; as above     Postmenopausal bone loss; ordered  DEXA     Menopausal osteoporosis     Postmenopausal atrophic vaginitis; asymptomatic     Screen for colon cancer; reports up-to-date negative     Abi Milligan MD

## 2025-01-14 ENCOUNTER — HOSPITAL ENCOUNTER (OUTPATIENT)
Dept: RADIOLOGY | Facility: CLINIC | Age: 70
Discharge: HOME | End: 2025-01-14
Payer: MEDICARE

## 2025-01-14 VITALS — WEIGHT: 215 LBS | HEIGHT: 68 IN | BODY MASS INDEX: 32.58 KG/M2

## 2025-01-14 DIAGNOSIS — Z12.31 ENCOUNTER FOR SCREENING MAMMOGRAM FOR MALIGNANT NEOPLASM OF BREAST: ICD-10-CM

## 2025-01-14 DIAGNOSIS — M81.0 POSTMENOPAUSAL BONE LOSS: ICD-10-CM

## 2025-01-14 DIAGNOSIS — Z78.0 MENOPAUSE: ICD-10-CM

## 2025-01-14 DIAGNOSIS — M81.0 MENOPAUSAL OSTEOPOROSIS: ICD-10-CM

## 2025-01-14 PROCEDURE — 77080 DXA BONE DENSITY AXIAL: CPT | Performed by: RADIOLOGY

## 2025-01-14 PROCEDURE — 77067 SCR MAMMO BI INCL CAD: CPT

## 2025-01-14 PROCEDURE — 77080 DXA BONE DENSITY AXIAL: CPT

## 2025-02-04 ENCOUNTER — APPOINTMENT (OUTPATIENT)
Dept: OTOLARYNGOLOGY | Facility: CLINIC | Age: 70
End: 2025-02-04
Payer: MEDICARE

## 2025-02-04 VITALS — WEIGHT: 214 LBS | HEIGHT: 66 IN | BODY MASS INDEX: 34.39 KG/M2

## 2025-02-04 DIAGNOSIS — J30.9 ALLERGIC RHINITIS, UNSPECIFIED SEASONALITY, UNSPECIFIED TRIGGER: ICD-10-CM

## 2025-02-04 DIAGNOSIS — H90.3 SENSORINEURAL HEARING LOSS (SNHL) OF BOTH EARS: ICD-10-CM

## 2025-02-04 DIAGNOSIS — H60.543 ECZEMA OF BOTH EXTERNAL EARS: Primary | ICD-10-CM

## 2025-02-04 PROCEDURE — 1036F TOBACCO NON-USER: CPT | Performed by: OTOLARYNGOLOGY

## 2025-02-04 PROCEDURE — 1159F MED LIST DOCD IN RCRD: CPT | Performed by: OTOLARYNGOLOGY

## 2025-02-04 PROCEDURE — 99213 OFFICE O/P EST LOW 20 MIN: CPT | Performed by: OTOLARYNGOLOGY

## 2025-02-04 PROCEDURE — 3008F BODY MASS INDEX DOCD: CPT | Performed by: OTOLARYNGOLOGY

## 2025-02-04 RX ORDER — FLUOCINOLONE ACETONIDE 0.11 MG/ML
OIL TOPICAL 3 TIMES DAILY
Qty: 30 ML | Refills: 3 | Status: SHIPPED | OUTPATIENT
Start: 2025-02-04 | End: 2026-02-04

## 2025-02-04 NOTE — PROGRESS NOTES
Chief Complaint   Patient presents with    Follow-up     LOV: 2/2024 ECZEMA, ALLERGIES & COCHLEAR IMPLANT. NEED REFILL ON FLUOCINOLONE DROPS      Date of Evaluation: 2/4/2025   HPI  She returns in follow-up for her ears.  She has a left cochlear implant and right hearing aid.  This is servicing her okay.  She has dry itchy ears and needs a refill on fluocinolone.  She does not want the cream but once the drops instead.  She is using Flonase for her allergies.  Luisa Pearson is a 69 y.o. female  is here for follow-up on her ears. She continues with some eczema complaints. Cochlear implant working well.  Her allergies are bothering her more.  She is only using Flonase every other day  History:  with a history of bilateral sensorineural hearing loss and bilateral eczema of the ear canals. She has a left cochlear implant and wears a right hearing aid that transmits to the cochlear implant. She is using Flonase for allergic rhinitis        Past Medical History:   Diagnosis Date    Age-related nuclear cataract of both eyes     Allergic penicilin    Arthritis both knees    Depression     HL (hearing loss) sever-both ears    Hypertension     Personal history of diseases of the skin and subcutaneous tissue     History of eczema    Personal history of other diseases of the circulatory system     History of hypertension    Personal history of other mental and behavioral disorders     History of depression    Unspecified disorder of refraction       Past Surgical History:   Procedure Laterality Date    BREAST BIOPSY Right     breast biopsy benign rit breast with clip 2016    OTHER SURGICAL HISTORY  02/08/2022    Cochlear implant surgery          Medications:   Current Outpatient Medications   Medication Instructions    alendronate (FOSAMAX) 70 mg, oral, Every 7 days, 30 MIN BEFORE FIRST FOOD, DRINK OR MEDICINE OF THE DAY WITH WATER    amLODIPine (NORVASC) 5 mg, oral, Daily    buPROPion XL (Wellbutrin XL) 150 mg 24 hr tablet  "TAKE 1 TABLET(150 MG) BY MOUTH DAILY IN THE MORNING. DO NOT CRUSH, CHEW, OR SPLIT    cholecalciferol (VITAMIN D-3) 2,000 Units, Daily    clotrimazole-betamethasone (Lotrisone) cream 1 Application, Topical, 2 times daily    escitalopram (LEXAPRO) 20 mg, oral, Daily    fluticasone (Flonase) 50 mcg/actuation nasal spray 1 spray, Daily    fluticasone (Flonase) 50 mcg/actuation nasal spray USE 2 SPRAYS IN EACH NOSTRIL DAILY    ketoconazole (NIZOral) 2 % cream 1 Application, Daily    triamcinolone (Kenalog) 0.5 % cream 3 times daily        Allergies:  Allergies   Allergen Reactions    Penicillins Unknown    Cefdinir Unknown        Physical Exam:  Last Recorded Vitals  Height 1.676 m (5' 6\"), weight 97.1 kg (214 lb).  []General appearance: Well-developed, well-nourished in no acute distress, conversant with normal voice quality    Head/face: No erythema or edema or facial tenderness, and normal facial nerve function bilaterally    External ear: Clear external auditory canals with normal pinnae, bilateral eczema.  Cerumen removed  Tube status: N/A  Middle ear: Tympanic membranes intact and mobile, middle ears normal.  Tympanic membrane perforation: N/A  Mastoid bowl: N/A  Hearing: Normal conversational awareness at normal speech thresholds    Nose visualized using: Anterior rhinoscopy  Nasal dorsum: Nontraumatic midline appearance  Septum: Midline, nonobstructing  Inferior turbinates: Normal, pink  Secretions: Dry    Oral cavity and oropharynx: Normal  Teeth: Good condition  Floor of mouth: without lesions  Palate: Normal hard palate, soft palate and uvula  Oropharynx: Clear, no lesions present  Buccal mucosa: Normal without masses or lesions  Lips: Normal    Nasopharynx: Inadequate mirror exam secondary to gag/anatomy    Neck:  Salivary glands: Normal bilateral parotid and submandibular glands by inspection and palpation.  Non-thyroid masses: No palpable masses or significant lymphadenopathy  Trachea: Midline  Thyroid: No " thyromegaly or palpable nodules  Temporomandibular joint: Nontender  Cervical range of motion: Normal    Neurologic exam: Alert and oriented x3, appropriate affect.  Cranial nerves II-XII normal bilaterally  Extraocular movement: Extraocular movement intact, normal gaze alignment        Luisa was seen today for follow-up.  Diagnoses and all orders for this visit:  Eczema of both external ears (Primary)  Sensorineural hearing loss (SNHL) of both ears  Allergic rhinitis, unspecified seasonality, unspecified trigger         PLAN  Increase Flonase to daily use.  Fluocinolone cream bilaterally as needed.  Recheck in 1 year.  Continue with cochlear implant    Arden Briones MD

## 2025-02-10 ENCOUNTER — TELEPHONE (OUTPATIENT)
Dept: OTOLARYNGOLOGY | Facility: CLINIC | Age: 70
End: 2025-02-10
Payer: MEDICARE

## 2025-02-10 DIAGNOSIS — L29.9 EAR ITCHING: Primary | ICD-10-CM

## 2025-02-10 RX ORDER — FLUOCINOLONE ACETONIDE 0.11 MG/ML
OIL AURICULAR (OTIC)
Qty: 1 ML | Refills: 11 | Status: SHIPPED | OUTPATIENT
Start: 2025-02-10

## 2025-02-10 NOTE — TELEPHONE ENCOUNTER
Dr. Briones pt. The wrong drops were called in.  Can you please send in a prescription for fluocinolone 0.01% ear drops in?

## 2025-03-17 ASSESSMENT — ENCOUNTER SYMPTOMS
ABDOMINAL PAIN: 0
SHORTNESS OF BREATH: 0
FEVER: 0

## 2025-03-18 NOTE — PROGRESS NOTES
[facility]: [facility]  PROGRESS NOTE      Luisa Pearson is a 69 y.o. female that is presenting today for Follow-up.    Assessment/Plan   Diagnoses and all orders for this visit:  Essential (primary) hypertension  -     CBC and Auto Differential; Future  -     Comprehensive Metabolic Panel; Future  -     TSH with reflex to Free T4 if abnormal; Future  Gastroesophageal reflux disease without esophagitis  Osteoporosis, unspecified osteoporosis type, unspecified pathological fracture presence  Mixed hyperlipidemia  -     Lipid Panel; Future  Hyperglycemia  -     Hemoglobin A1C; Future  Vitamin D deficiency  -     Vitamin D 25-Hydroxy,Total (for eval of Vitamin D levels); Future  Onychomycosis  -     Referral to Podiatry; Future  Other orders  -     Follow Up In Primary Care - Established  -     Follow Up In Primary Care - Established; Future    Hypertension stable on the amlodipine, continue as is.    Depression, continue the escitalopram and the Wellbutrin.    Osteoporosis on alendronate.    Reviewed screening and prevention schedule    Subjective   HPI  69 y.o. female here for follow up.  Generally doing well no complaints.  Taking medications as outlined here.  Blood pressure has been under good control.  She seems to be doing better this spring in terms of her mood symptoms although she says she has good days and bad days.  She continues to work part-time.  She says when she is home she is somewhat apathetic.    Review of Systems   Constitutional:  Negative for fever.   Respiratory:  Negative for shortness of breath.    Cardiovascular:  Negative for chest pain.   Gastrointestinal:  Negative for abdominal pain.   All other systems reviewed and are negative.     Objective   Vitals:    03/20/25 1054   BP: 134/72   Pulse: 76   Temp: 36.2 °C (97.1 °F)   SpO2: 97%      Body mass index is 34.22 kg/m².  Physical Exam  Vitals reviewed.   Constitutional:       Appearance: Normal appearance.  "  Cardiovascular:      Rate and Rhythm: Normal rate and regular rhythm.      Heart sounds: No murmur heard.  Pulmonary:      Breath sounds: Normal breath sounds. No wheezing, rhonchi or rales.   Musculoskeletal:      Right lower leg: No edema.      Left lower leg: No edema.       Diagnostic Results   Lab Results   Component Value Date    GLUCOSE 105 (H) 08/29/2024    CALCIUM 9.7 08/29/2024     08/29/2024    K 4.7 08/29/2024    CO2 23 (L) 08/29/2024     08/29/2024    BUN 13 08/29/2024    CREATININE 0.80 08/29/2024     Lab Results   Component Value Date    ALT 13 08/29/2024    AST 16 08/29/2024    ALKPHOS 64 08/29/2024    BILITOT 0.8 08/29/2024     Lab Results   Component Value Date    WBC 6.0 08/29/2024    HGB 14.7 08/29/2024    HCT 42.5 08/29/2024    MCV 89 08/29/2024     08/29/2024     Lab Results   Component Value Date    CHOL 229 (H) 08/29/2024    CHOL 230 (H) 08/22/2023    CHOL 225 (H) 10/04/2022     Lab Results   Component Value Date    HDL 48.0 (L) 08/29/2024    HDL 51 08/22/2023    HDL 49 (L) 10/04/2022     Lab Results   Component Value Date    LDLCALC 156 (H) 08/29/2024    LDLCALC 160 (H) 08/22/2023    LDLCALC 156 (H) 10/04/2022     Lab Results   Component Value Date    TRIG 123 08/29/2024    TRIG 94 08/22/2023    TRIG 101 10/04/2022     No components found for: \"CHOLHDL\"  Lab Results   Component Value Date    HGBA1C 5.5 08/29/2024     Other labs not included in the list above were reviewed either before or during this encounter.    History    Past Medical History:   Diagnosis Date    Age-related nuclear cataract of both eyes     Allergic penicilin    Arthritis both knees    Depression     HL (hearing loss) sever-both ears    Hypertension     Personal history of diseases of the skin and subcutaneous tissue     History of eczema    Personal history of other diseases of the circulatory system     History of hypertension    Personal history of other mental and behavioral disorders     History " of depression    Unspecified disorder of refraction      Past Surgical History:   Procedure Laterality Date    BREAST BIOPSY Right     breast biopsy benign rit breast with clip 2016    OTHER SURGICAL HISTORY  02/08/2022    Cochlear implant surgery     Family History   Problem Relation Name Age of Onset    Lung cancer Mother Corazon     Cancer Mother Coraozn     Prostate cancer Father Sharif     Cancer Father Sharif     Breast cancer Neg Hx      Ovarian cancer Neg Hx       Social History     Socioeconomic History    Marital status:      Spouse name: Not on file    Number of children: Not on file    Years of education: Not on file    Highest education level: Not on file   Occupational History    Not on file   Tobacco Use    Smoking status: Former     Current packs/day: 0.50     Average packs/day: 0.5 packs/day for 30.0 years (15.0 ttl pk-yrs)     Types: Cigarettes    Smokeless tobacco: Never   Vaping Use    Vaping status: Never Used   Substance and Sexual Activity    Alcohol use: Yes     Alcohol/week: 2.0 standard drinks of alcohol     Types: 2 Glasses of wine per week     Comment: social wine and beer    Drug use: Not Currently    Sexual activity: Not Currently     Birth control/protection: Post-menopausal   Other Topics Concern    Not on file   Social History Narrative    Not on file     Social Drivers of Health     Financial Resource Strain: Not on file   Food Insecurity: Not on file   Transportation Needs: Not on file   Physical Activity: Not on file   Stress: Not on file   Social Connections: Not on file   Intimate Partner Violence: Not on file   Housing Stability: Not on file     Allergies   Allergen Reactions    Penicillins Unknown    Cefdinir Unknown     Current Outpatient Medications on File Prior to Visit   Medication Sig Dispense Refill    alendronate (Fosamax) 70 mg tablet Take 1 tablet (70 mg) by mouth every 7 days. 30 MIN BEFORE FIRST FOOD, DRINK OR MEDICINE OF THE DAY WITH WATER 12 tablet 3     amLODIPine (Norvasc) 5 mg tablet Take 1 tablet (5 mg) by mouth once daily. 90 tablet 3    buPROPion XL (Wellbutrin XL) 150 mg 24 hr tablet TAKE 1 TABLET(150 MG) BY MOUTH DAILY IN THE MORNING. DO NOT CRUSH, CHEW, OR SPLIT 30 tablet 11    cholecalciferol (Vitamin D-3) 50 MCG (2000 UT) tablet Take 1 tablet (2,000 Units) by mouth once daily.      clotrimazole-betamethasone (Lotrisone) cream Apply 1 Application topically 2 times a day. 15 g 0    escitalopram (Lexapro) 20 mg tablet Take 1 tablet (20 mg) by mouth once daily. 90 tablet 2    fluocinolone (Derma-Smoothe/FS Body Oil) 0.01 % external oil Apply topically 3 times a day. 30 mL 3    fluocinolone (DermOtic) 0.01 % ear drops 4 drops to the affected ear/s twice daily as needed for itching. 1 bottle. 11 refills. 1 mL 11    fluticasone (Flonase) 50 mcg/actuation nasal spray Administer 1 spray into each nostril once daily.      fluticasone (Flonase) 50 mcg/actuation nasal spray USE 2 SPRAYS IN EACH NOSTRIL DAILY 48 g 11    ketoconazole (NIZOral) 2 % cream Apply 1 Application topically once daily.      triamcinolone (Kenalog) 0.5 % cream Apply topically 3 times a day.       No current facility-administered medications on file prior to visit.     Immunization History   Administered Date(s) Administered    COVID-19, mRNA, LNP-S, PF, 30 mcg/0.3 mL dose 05/20/2021, 06/15/2021    Flu vaccine (IIV4), preservative free *Check age/dose* 10/16/2019    Flu vaccine, quadrivalent, high-dose, preservative free, age 65y+ (FLUZONE) 10/24/2020, 11/17/2022    Flu vaccine, quadrivalent, no egg protein, age 6 month or greater (FLUCELVAX) 10/18/2017, 10/11/2018    Flu vaccine, trivalent, preservative free, HIGH-DOSE, age 65y+ (Fluzone) 09/19/2024    Flu vaccine, trivalent, preservative free, age 6 months and greater (Fluarix/Fluzone/Flulaval) 10/06/2015    Influenza, Seasonal, Quadrivalent, Adjuvanted 11/03/2021    Influenza, injectable, quadrivalent 10/08/2018, 10/19/2020    Influenza,  seasonal, injectable 10/01/2014, 10/30/2014    Pfizer COVID-19 vaccine, 12 years and older, (30mcg/0.3mL) (Comirnaty) 11/09/2023    Pfizer Gray Cap SARS-CoV-2 01/22/2022    Pneumococcal conjugate vaccine, 13-valent (PREVNAR 13) 02/11/2020    Pneumococcal polysaccharide vaccine, 23-valent, age 2 years and older (PNEUMOVAX 23) 04/13/2021, 08/23/2022     Patient's medical history was reviewed and updated either before or during this encounter.       Qasim Underwood MD

## 2025-03-20 ENCOUNTER — OFFICE VISIT (OUTPATIENT)
Dept: PRIMARY CARE | Facility: CLINIC | Age: 70
End: 2025-03-20
Payer: MEDICARE

## 2025-03-20 VITALS
HEART RATE: 76 BPM | DIASTOLIC BLOOD PRESSURE: 72 MMHG | HEIGHT: 66 IN | TEMPERATURE: 97.1 F | OXYGEN SATURATION: 97 % | BODY MASS INDEX: 34.07 KG/M2 | SYSTOLIC BLOOD PRESSURE: 134 MMHG | WEIGHT: 212 LBS

## 2025-03-20 DIAGNOSIS — R73.9 HYPERGLYCEMIA: ICD-10-CM

## 2025-03-20 DIAGNOSIS — I10 ESSENTIAL (PRIMARY) HYPERTENSION: Primary | ICD-10-CM

## 2025-03-20 DIAGNOSIS — E78.2 MIXED HYPERLIPIDEMIA: ICD-10-CM

## 2025-03-20 DIAGNOSIS — K21.9 GASTROESOPHAGEAL REFLUX DISEASE WITHOUT ESOPHAGITIS: ICD-10-CM

## 2025-03-20 DIAGNOSIS — B35.1 ONYCHOMYCOSIS: ICD-10-CM

## 2025-03-20 DIAGNOSIS — M81.0 OSTEOPOROSIS, UNSPECIFIED OSTEOPOROSIS TYPE, UNSPECIFIED PATHOLOGICAL FRACTURE PRESENCE: ICD-10-CM

## 2025-03-20 DIAGNOSIS — E55.9 VITAMIN D DEFICIENCY: ICD-10-CM

## 2025-03-20 PROCEDURE — 1036F TOBACCO NON-USER: CPT | Performed by: INTERNAL MEDICINE

## 2025-03-20 PROCEDURE — 3078F DIAST BP <80 MM HG: CPT | Performed by: INTERNAL MEDICINE

## 2025-03-20 PROCEDURE — 3075F SYST BP GE 130 - 139MM HG: CPT | Performed by: INTERNAL MEDICINE

## 2025-03-20 PROCEDURE — 99214 OFFICE O/P EST MOD 30 MIN: CPT | Performed by: INTERNAL MEDICINE

## 2025-03-20 PROCEDURE — 1159F MED LIST DOCD IN RCRD: CPT | Performed by: INTERNAL MEDICINE

## 2025-03-20 PROCEDURE — G2211 COMPLEX E/M VISIT ADD ON: HCPCS | Performed by: INTERNAL MEDICINE

## 2025-03-20 PROCEDURE — 3008F BODY MASS INDEX DOCD: CPT | Performed by: INTERNAL MEDICINE

## 2025-03-20 PROCEDURE — 1126F AMNT PAIN NOTED NONE PRSNT: CPT | Performed by: INTERNAL MEDICINE

## 2025-03-20 ASSESSMENT — PATIENT HEALTH QUESTIONNAIRE - PHQ9
2. FEELING DOWN, DEPRESSED OR HOPELESS: NOT AT ALL
SUM OF ALL RESPONSES TO PHQ9 QUESTIONS 1 AND 2: 0
1. LITTLE INTEREST OR PLEASURE IN DOING THINGS: NOT AT ALL

## 2025-03-20 ASSESSMENT — PAIN SCALES - GENERAL: PAINLEVEL_OUTOF10: 0-NO PAIN

## 2025-03-21 DIAGNOSIS — B37.2 CANDIDIASIS OF SKIN: ICD-10-CM

## 2025-03-21 RX ORDER — CLOTRIMAZOLE AND BETAMETHASONE DIPROPIONATE 10; .64 MG/G; MG/G
CREAM TOPICAL
Qty: 15 G | Refills: 0 | Status: SHIPPED | OUTPATIENT
Start: 2025-03-21

## 2025-04-16 DIAGNOSIS — M81.0 OSTEOPOROSIS, UNSPECIFIED OSTEOPOROSIS TYPE, UNSPECIFIED PATHOLOGICAL FRACTURE PRESENCE: ICD-10-CM

## 2025-04-16 RX ORDER — ALENDRONATE SODIUM 70 MG/1
70 TABLET ORAL
Qty: 12 TABLET | Refills: 3 | Status: SHIPPED | OUTPATIENT
Start: 2025-04-16

## 2025-06-09 DIAGNOSIS — F32.A DEPRESSION, UNSPECIFIED DEPRESSION TYPE: ICD-10-CM

## 2025-06-09 RX ORDER — ESCITALOPRAM OXALATE 20 MG/1
20 TABLET ORAL DAILY
Qty: 90 TABLET | Refills: 3 | Status: SHIPPED | OUTPATIENT
Start: 2025-06-09

## 2025-08-05 ENCOUNTER — TELEPHONE (OUTPATIENT)
Dept: OTOLARYNGOLOGY | Facility: CLINIC | Age: 70
End: 2025-08-05
Payer: MEDICARE

## 2025-08-05 DIAGNOSIS — L29.9 EAR ITCHING: ICD-10-CM

## 2025-08-05 RX ORDER — FLUOCINOLONE ACETONIDE 0.11 MG/ML
OIL AURICULAR (OTIC)
Qty: 1 ML | Refills: 11 | Status: SHIPPED | OUTPATIENT
Start: 2025-08-05

## 2025-08-07 ENCOUNTER — TELEPHONE (OUTPATIENT)
Dept: OTOLARYNGOLOGY | Facility: CLINIC | Age: 70
End: 2025-08-07
Payer: MEDICARE

## 2025-08-07 DIAGNOSIS — H60.543 ECZEMA OF BOTH EXTERNAL EARS: Primary | ICD-10-CM

## 2025-08-07 NOTE — TELEPHONE ENCOUNTER
Pt is asking that the fluocinolone oil prescription be switched to the cream.  She does not like the oil.

## 2025-08-08 RX ORDER — FLUOCINOLONE ACETONIDE 0.1 MG/G
CREAM TOPICAL
Qty: 1 G | Refills: 3 | Status: SHIPPED | OUTPATIENT
Start: 2025-08-08

## 2025-09-16 ENCOUNTER — APPOINTMENT (OUTPATIENT)
Dept: PRIMARY CARE | Facility: CLINIC | Age: 70
End: 2025-09-16
Payer: MEDICARE

## 2025-09-26 ENCOUNTER — APPOINTMENT (OUTPATIENT)
Dept: PRIMARY CARE | Facility: CLINIC | Age: 70
End: 2025-09-26
Payer: MEDICARE

## 2025-10-07 ENCOUNTER — APPOINTMENT (OUTPATIENT)
Dept: PRIMARY CARE | Facility: CLINIC | Age: 70
End: 2025-10-07
Payer: MEDICARE

## 2025-10-30 ENCOUNTER — APPOINTMENT (OUTPATIENT)
Dept: OPHTHALMOLOGY | Facility: CLINIC | Age: 70
End: 2025-10-30
Payer: MEDICARE

## 2026-02-10 ENCOUNTER — APPOINTMENT (OUTPATIENT)
Dept: OTOLARYNGOLOGY | Facility: CLINIC | Age: 71
End: 2026-02-10
Payer: MEDICARE